# Patient Record
Sex: FEMALE | Race: ASIAN | NOT HISPANIC OR LATINO | Employment: FULL TIME | URBAN - METROPOLITAN AREA
[De-identification: names, ages, dates, MRNs, and addresses within clinical notes are randomized per-mention and may not be internally consistent; named-entity substitution may affect disease eponyms.]

---

## 2017-09-12 ENCOUNTER — OFFICE VISIT (OUTPATIENT)
Dept: FAMILY MEDICINE | Facility: CLINIC | Age: 27
End: 2017-09-12
Attending: FAMILY MEDICINE
Payer: COMMERCIAL

## 2017-09-12 ENCOUNTER — LAB VISIT (OUTPATIENT)
Dept: LAB | Facility: HOSPITAL | Age: 27
End: 2017-09-12
Attending: FAMILY MEDICINE
Payer: COMMERCIAL

## 2017-09-12 VITALS
SYSTOLIC BLOOD PRESSURE: 90 MMHG | RESPIRATION RATE: 16 BRPM | OXYGEN SATURATION: 97 % | HEIGHT: 63 IN | DIASTOLIC BLOOD PRESSURE: 68 MMHG | BODY MASS INDEX: 18.21 KG/M2 | WEIGHT: 102.81 LBS | HEART RATE: 70 BPM

## 2017-09-12 DIAGNOSIS — Z01.419 ENCOUNTER FOR GYNECOLOGICAL EXAMINATION WITH PAPANICOLAOU SMEAR OF CERVIX: Primary | ICD-10-CM

## 2017-09-12 DIAGNOSIS — Z01.419 ENCOUNTER FOR GYNECOLOGICAL EXAMINATION WITH PAPANICOLAOU SMEAR OF CERVIX: ICD-10-CM

## 2017-09-12 LAB
ALBUMIN SERPL BCP-MCNC: 4 G/DL
ALP SERPL-CCNC: 48 U/L
ALT SERPL W/O P-5'-P-CCNC: 17 U/L
ANION GAP SERPL CALC-SCNC: 6 MMOL/L
AST SERPL-CCNC: 21 U/L
BASOPHILS # BLD AUTO: 0.01 K/UL
BASOPHILS NFR BLD: 0.2 %
BILIRUB SERPL-MCNC: 0.9 MG/DL
BILIRUB SERPL-MCNC: NEGATIVE MG/DL
BLOOD URINE, POC: NEGATIVE
BUN SERPL-MCNC: 15 MG/DL
CALCIUM SERPL-MCNC: 9.9 MG/DL
CHLORIDE SERPL-SCNC: 107 MMOL/L
CO2 SERPL-SCNC: 25 MMOL/L
COLOR, POC UA: YELLOW
CREAT SERPL-MCNC: 0.7 MG/DL
DIFFERENTIAL METHOD: ABNORMAL
EOSINOPHIL # BLD AUTO: 0.1 K/UL
EOSINOPHIL NFR BLD: 1 %
ERYTHROCYTE [DISTWIDTH] IN BLOOD BY AUTOMATED COUNT: 12.1 %
EST. GFR  (AFRICAN AMERICAN): >60 ML/MIN/1.73 M^2
EST. GFR  (NON AFRICAN AMERICAN): >60 ML/MIN/1.73 M^2
GLUCOSE SERPL-MCNC: 76 MG/DL
GLUCOSE UR QL STRIP: NORMAL
HCT VFR BLD AUTO: 37.9 %
HGB BLD-MCNC: 13.4 G/DL
KETONES UR QL STRIP: NEGATIVE
LEUKOCYTE ESTERASE URINE, POC: NEGATIVE
LYMPHOCYTES # BLD AUTO: 1.8 K/UL
LYMPHOCYTES NFR BLD: 34.1 %
MCH RBC QN AUTO: 30.5 PG
MCHC RBC AUTO-ENTMCNC: 35.4 G/DL
MCV RBC AUTO: 86 FL
MONOCYTES # BLD AUTO: 0.5 K/UL
MONOCYTES NFR BLD: 9.1 %
NEUTROPHILS # BLD AUTO: 2.9 K/UL
NEUTROPHILS NFR BLD: 55.6 %
NITRITE, POC UA: NEGATIVE
PH, POC UA: 7
PLATELET # BLD AUTO: 236 K/UL
PMV BLD AUTO: 8.7 FL
POTASSIUM SERPL-SCNC: 4.1 MMOL/L
PROT SERPL-MCNC: 7 G/DL
PROTEIN, POC: NEGATIVE
RBC # BLD AUTO: 4.4 M/UL
SODIUM SERPL-SCNC: 138 MMOL/L
SPECIFIC GRAVITY, POC UA: 1
TSH SERPL DL<=0.005 MIU/L-ACNC: 0.87 UIU/ML
UROBILINOGEN, POC UA: NORMAL
WBC # BLD AUTO: 5.19 K/UL

## 2017-09-12 PROCEDURE — 86703 HIV-1/HIV-2 1 RESULT ANTBDY: CPT

## 2017-09-12 PROCEDURE — 88141 CYTOPATH C/V INTERPRET: CPT | Mod: ,,, | Performed by: PATHOLOGY

## 2017-09-12 PROCEDURE — 80053 COMPREHEN METABOLIC PANEL: CPT

## 2017-09-12 PROCEDURE — 84443 ASSAY THYROID STIM HORMONE: CPT

## 2017-09-12 PROCEDURE — 99385 PREV VISIT NEW AGE 18-39: CPT | Mod: 25,S$GLB,, | Performed by: FAMILY MEDICINE

## 2017-09-12 PROCEDURE — 86695 HERPES SIMPLEX TYPE 1 TEST: CPT

## 2017-09-12 PROCEDURE — 81001 URINALYSIS AUTO W/SCOPE: CPT | Mod: S$GLB,,, | Performed by: FAMILY MEDICINE

## 2017-09-12 PROCEDURE — 87591 N.GONORRHOEAE DNA AMP PROB: CPT

## 2017-09-12 PROCEDURE — 88175 CYTOPATH C/V AUTO FLUID REDO: CPT | Performed by: PATHOLOGY

## 2017-09-12 PROCEDURE — 87624 HPV HI-RISK TYP POOLED RSLT: CPT

## 2017-09-12 PROCEDURE — 36415 COLL VENOUS BLD VENIPUNCTURE: CPT | Mod: PO

## 2017-09-12 PROCEDURE — 99999 PR PBB SHADOW E&M-NEW PATIENT-LVL III: CPT | Mod: PBBFAC,,, | Performed by: FAMILY MEDICINE

## 2017-09-12 PROCEDURE — 85025 COMPLETE CBC W/AUTO DIFF WBC: CPT

## 2017-09-12 RX ORDER — NORGESTIMATE AND ETHINYL ESTRADIOL 7DAYSX3 28
1 KIT ORAL DAILY
Qty: 84 TABLET | Refills: 3 | Status: SHIPPED | OUTPATIENT
Start: 2017-09-12 | End: 2017-10-18

## 2017-09-12 NOTE — PATIENT INSTRUCTIONS
Your test results will be communicated to you via : [facility], Telephone or Letter.   If you have not received your test results in one week, please contact the clinic at [phone].

## 2017-09-12 NOTE — PROGRESS NOTES
Subjective:       Patient ID: Ramona Dukes is a 27 y.o. female.    Chief Complaint: Gynecologic Exam    HPI   Pt is here for wwe she is well no abnl vaginal bleeding no discharge itching or burnign no dysuria or hematuria no brbpr   Review of Systems   Constitutional: Negative for activity change, chills, diaphoresis, fatigue and fever.   HENT: Negative for congestion, ear discharge, ear pain, hearing loss, postnasal drip, rhinorrhea, sinus pressure, sneezing, sore throat, trouble swallowing and voice change.    Eyes: Negative for photophobia, discharge, redness, itching and visual disturbance.   Respiratory: Negative for cough, chest tightness, shortness of breath and wheezing.    Cardiovascular: Negative for chest pain, palpitations and leg swelling.   Gastrointestinal: Negative for abdominal pain, anal bleeding, blood in stool, constipation, diarrhea, nausea, rectal pain and vomiting.   Genitourinary: Negative for dyspareunia, dysuria, flank pain, frequency, hematuria, menstrual problem, pelvic pain, urgency, vaginal bleeding and vaginal discharge.   Musculoskeletal: Negative for arthralgias, back pain, joint swelling and neck pain.   Skin: Negative for color change and rash.   Neurological: Negative for dizziness, speech difficulty, weakness, light-headedness, numbness and headaches.   Hematological: Does not bruise/bleed easily.   Psychiatric/Behavioral: Negative for agitation, confusion, decreased concentration, sleep disturbance and suicidal ideas. The patient is not nervous/anxious.       gObjective:      Physical Exam   Constitutional: She appears well-developed and well-nourished.   HENT:   Head: Normocephalic and atraumatic.   Right Ear: External ear normal.   Left Ear: External ear normal.   Nose: Nose normal.   Mouth/Throat: Oropharynx is clear and moist.   Eyes: Conjunctivae and EOM are normal. Pupils are equal, round, and reactive to light. Right eye exhibits no discharge. Left eye exhibits no  "discharge.   Neck: Normal range of motion. Neck supple. No thyromegaly present.   Cardiovascular: Normal rate, regular rhythm, normal heart sounds and intact distal pulses.  Exam reveals no gallop and no friction rub.    No murmur heard.  Pulmonary/Chest: Effort normal and breath sounds normal. She has no wheezes. She has no rales.   Abdominal: Soft. Bowel sounds are normal. She exhibits no distension. There is no tenderness. There is no rebound and no guarding.   Genitourinary: Vagina normal and uterus normal. No vaginal discharge found.   Genitourinary Comments: nefg v/v urethra/urethral meatus w/o leisons or prolapse normal hair distribution cervix pink no adnexal tenderness or fullness moveable uterus    Breasts symmetric no masses nipple discharge or overlying skin changes    Musculoskeletal: Normal range of motion. She exhibits no edema or tenderness.   Lymphadenopathy:     She has no cervical adenopathy.   Neurological: She is alert. No cranial nerve deficit. She exhibits normal muscle tone. Coordination normal.   Skin: Skin is warm and dry. No rash noted. No erythema.   Psychiatric: She has a normal mood and affect. Her behavior is normal. Judgment and thought content normal.       Assessment:       1. Encounter for gynecological examination with Papanicolaou smear of cervix        Plan:     orders cmp liid cbc tsh urine pap gc/chl hiv hsv   Cont meds   Low fat diet  Graded exercise    Health maintenance  Pap today  Breast exam today  Lipid declined pt not fasting  Flu shot in fall  Tetanus q 10 years  "This note will not be shared with the patient."   "

## 2017-09-13 LAB
C TRACH DNA SPEC QL NAA+PROBE: NOT DETECTED
HIV 1+2 AB+HIV1 P24 AG SERPL QL IA: NEGATIVE
N GONORRHOEA DNA SPEC QL NAA+PROBE: NOT DETECTED

## 2017-09-15 LAB
HSV1 IGG SERPL QL IA: POSITIVE
HSV2 IGG SERPL QL IA: NEGATIVE

## 2017-09-19 LAB
HPV HR 12 DNA CVX QL NAA+PROBE: POSITIVE
HPV16 DNA SPEC QL NAA+PROBE: NEGATIVE
HPV18 DNA SPEC QL NAA+PROBE: NEGATIVE

## 2017-10-09 ENCOUNTER — TELEPHONE (OUTPATIENT)
Dept: FAMILY MEDICINE | Facility: CLINIC | Age: 27
End: 2017-10-09

## 2017-10-09 NOTE — TELEPHONE ENCOUNTER
----- Message from Lisa Seymourleila sent at 10/9/2017  3:37 PM CDT -----  Contact: Patient  x_  1st Request  _  2nd Request  _  3rd Request        Who: MOOK FINNEY [[id number]]    Why: Pt called to schedule a follow up appointment per the doctor's request. I attempted to schedule but nothing until November. Please call her.     What Number to Call Back: [phone]    When to Expect a call back: (Before the end of the day)   -- if the call is after 12:00, the call back will be tomorrow.

## 2017-10-18 ENCOUNTER — OFFICE VISIT (OUTPATIENT)
Dept: FAMILY MEDICINE | Facility: CLINIC | Age: 27
End: 2017-10-18
Attending: FAMILY MEDICINE
Payer: COMMERCIAL

## 2017-10-18 VITALS
WEIGHT: 104.81 LBS | SYSTOLIC BLOOD PRESSURE: 122 MMHG | OXYGEN SATURATION: 99 % | HEIGHT: 63 IN | BODY MASS INDEX: 18.57 KG/M2 | HEART RATE: 75 BPM | RESPIRATION RATE: 16 BRPM | DIASTOLIC BLOOD PRESSURE: 60 MMHG

## 2017-10-18 DIAGNOSIS — R87.618 PAP SMEAR ABNORMALITY OF CERVIX/HUMAN PAPILLOMAVIRUS (HPV) POSITIVE: Primary | ICD-10-CM

## 2017-10-18 PROCEDURE — 90471 IMMUNIZATION ADMIN: CPT | Mod: S$GLB,,, | Performed by: FAMILY MEDICINE

## 2017-10-18 PROCEDURE — 90686 IIV4 VACC NO PRSV 0.5 ML IM: CPT | Mod: S$GLB,,, | Performed by: FAMILY MEDICINE

## 2017-10-18 PROCEDURE — 99999 PR PBB SHADOW E&M-EST. PATIENT-LVL III: CPT | Mod: PBBFAC,,, | Performed by: FAMILY MEDICINE

## 2017-10-18 PROCEDURE — 90472 IMMUNIZATION ADMIN EACH ADD: CPT | Mod: S$GLB,,, | Performed by: FAMILY MEDICINE

## 2017-10-18 PROCEDURE — 90715 TDAP VACCINE 7 YRS/> IM: CPT | Mod: S$GLB,,, | Performed by: FAMILY MEDICINE

## 2017-10-18 PROCEDURE — 99213 OFFICE O/P EST LOW 20 MIN: CPT | Mod: 25,S$GLB,, | Performed by: FAMILY MEDICINE

## 2017-10-18 NOTE — PROGRESS NOTES
"Subjective:       Patient ID: Rmaona Dukes is a 27 y.o. female.    Chief Complaint: Results    HPI   Pt is here for follow up of pos hpv she has had normal pap in the past she is willing to see gyn for their work up   Pt has been stressed about this headaches constipation al little sad denies panic attacks denies si/hi feels better with our discussion    Review of Systems   Constitutional: Negative for activity change, fatigue and unexpected weight change.   HENT: Negative for hearing loss, rhinorrhea and trouble swallowing.    Eyes: Negative for discharge and visual disturbance.   Respiratory: Negative for chest tightness and wheezing.    Cardiovascular: Negative for chest pain and palpitations.   Gastrointestinal: Positive for constipation. Negative for blood in stool, diarrhea and vomiting.   Endocrine: Negative for polydipsia and polyuria.   Genitourinary: Negative for difficulty urinating, dysuria, hematuria and menstrual problem.   Musculoskeletal: Negative for arthralgias, joint swelling and neck pain.   Neurological: Positive for headaches. Negative for weakness.   Psychiatric/Behavioral: Positive for dysphoric mood. Negative for confusion.       Objective:      Physical Exam   Constitutional: She appears well-developed and well-nourished. No distress.   Cardiovascular: Normal rate and regular rhythm.  Exam reveals no gallop.    Pulmonary/Chest: Effort normal and breath sounds normal. No respiratory distress. She has no rales.   Abdominal: Soft. Bowel sounds are normal. She exhibits no distension and no mass. There is no tenderness.       Assessment:       1. Pap smear abnormality of cervix/human papillomavirus (HPV) positive        Plan:     f/u gyn  Use condoms  rtc prn       "This note will not be shared with the patient."   "

## 2017-10-18 NOTE — PROGRESS NOTES
Two patient identifiers verified. Allergies reviewed.  Tdap IM administered to Left deltoid and FLU Vaccine IM administered to Right deltoid per MD order. Patient tolerated injection well: no redness, bleeding, or bruising noted to injection site. Patient instructed to remain in clinic setting for 15 minutes.

## 2017-10-18 NOTE — PATIENT INSTRUCTIONS
Your test results will be communicated to you via : [facility], Telephone or Letter.   If you have not received your test results in one week, please contact the clinic at [phone].

## 2017-10-25 ENCOUNTER — OFFICE VISIT (OUTPATIENT)
Dept: OBSTETRICS AND GYNECOLOGY | Facility: CLINIC | Age: 27
End: 2017-10-25
Attending: FAMILY MEDICINE
Payer: COMMERCIAL

## 2017-10-25 VITALS
WEIGHT: 107.56 LBS | DIASTOLIC BLOOD PRESSURE: 60 MMHG | HEIGHT: 63 IN | BODY MASS INDEX: 19.06 KG/M2 | SYSTOLIC BLOOD PRESSURE: 100 MMHG

## 2017-10-25 DIAGNOSIS — Z30.09 GENERAL COUNSELING AND ADVICE FOR CONTRACEPTIVE MANAGEMENT: ICD-10-CM

## 2017-10-25 DIAGNOSIS — N92.6 IRREGULAR MENSES: ICD-10-CM

## 2017-10-25 DIAGNOSIS — B97.7 HIGH RISK HPV INFECTION: Primary | ICD-10-CM

## 2017-10-25 DIAGNOSIS — N94.6 DYSMENORRHEA: ICD-10-CM

## 2017-10-25 DIAGNOSIS — Z71.85 HPV VACCINE COUNSELING: ICD-10-CM

## 2017-10-25 PROCEDURE — 99203 OFFICE O/P NEW LOW 30 MIN: CPT | Mod: S$GLB,,, | Performed by: OBSTETRICS & GYNECOLOGY

## 2017-10-25 PROCEDURE — 99999 PR PBB SHADOW E&M-EST. PATIENT-LVL III: CPT | Mod: PBBFAC,,, | Performed by: OBSTETRICS & GYNECOLOGY

## 2017-10-25 NOTE — PROGRESS NOTES
HISTORY OF PRESENT ILLNESS:    Ramona Dukes is a 27 y.o. female, , Patient's last menstrual period was 10/04/2017.,  presents from PCP for  ASCUS/HPV+ pap.  No prior abnormal pap - last screening about 3 years ago.  Has not had gardasil.    On OCPs in the past but had trouble remembering and was hernandez on it.  Long history of dysmenorrhea and irregular cycles.  Wants to resume contraception.    Past Medical History:   Diagnosis Date    Abnormal Pap smear of cervix        Past Surgical History:   Procedure Laterality Date    APPENDECTOMY         MEDICATIONS AND ALLERGIES:    No current outpatient prescriptions on file.    Review of patient's allergies indicates:  No Known Allergies    Family History   Problem Relation Age of Onset    Breast cancer Neg Hx     Colon cancer Neg Hx     Ovarian cancer Neg Hx        Social History     Social History    Marital status: Single     Spouse name: N/A    Number of children: N/A    Years of education: N/A     Occupational History    Not on file.     Social History Main Topics    Smoking status: Never Smoker    Smokeless tobacco: Never Used    Alcohol use Yes    Drug use: No    Sexual activity: Yes     Partners: Male     Birth control/ protection: None     Other Topics Concern    Not on file     Social History Narrative    No narrative on file       COMPREHENSIVE GYN HISTORY:  Infection History: Denies STDs. Denies PID.  Benign History: Denies uterine fibroids. Denies ovarian cysts. Denies endometriosis. Denies other conditions.  Cancer History: Denies cervical cancer. Denies uterine cancer or hyperplasia. Denies ovarian cancer. Denies vulvar cancer or pre-cancer. Denies vaginal cancer or pre-cancer. Denies breast cancer. Denies colon cancer.    ROS:  GENERAL: No weight changes. No swelling. No fatigue. No fever.  CARDIOVASCULAR: No chest pain. No shortness of breath. No leg cramps.   NEUROLOGICAL: No headaches. No vision changes.  BREASTS: No pain. No  "lumps. No discharge.  ABDOMEN: No pain. No nausea. No vomiting. No diarrhea. No constipation.  REPRODUCTIVE: No abnormal bleeding.   VULVA: No pain. No lesions. No itching.  VAGINA: No relaxation. No itching. No odor. No discharge. No lesions.  URINARY: No incontinence. No nocturia. No frequency. No dysuria.    /60   Ht 5' 3" (1.6 m)   Wt 48.8 kg (107 lb 9.4 oz)   LMP 10/04/2017   BMI 19.06 kg/m²     PE:  APPEARANCE: Well nourished, well developed, in no acute distress.  ABDOMEN: Soft. No tenderness or masses. No hepatosplenomegaly. No hernias.  BREASTS, FUNDOSCOPIC, RECTAL DEFERRED  PELVIC: External female genitalia without lesions.  Female hair distribution. Adequate perineal body, Normal urethral meatus. Vagina moist and well rugated without lesions or discharge.  No significant cystocele or rectocele present. Cervix pink without lesions, discharge or tenderness. Uterus is normal size, regular, mobile and nontender. Adnexa without masses or tenderness.  EXTREMITIES: No edema      DIAGNOSIS:  1. High risk HPV infection     2. HPV vaccine counseling  (In Office Administered) HPV Vaccine (9-Valent) (3 Dose) (IM)   3. Dysmenorrhea  US Pelvis Complete Non OB   4. General counseling and advice for contraceptive management     5. Irregular menses       COUNSELING:  Patient counseled regarding pap screening, HPV, recommendation for colpo, and gardasil.  Patient will start gardasil  When she comes back for colpo.  Patient counseled regarding contraceptive options.  Desires iud - mirena or asha.  Will check coverage  "

## 2017-10-30 ENCOUNTER — TELEPHONE (OUTPATIENT)
Dept: OBSTETRICS AND GYNECOLOGY | Facility: CLINIC | Age: 27
End: 2017-10-30

## 2017-10-30 NOTE — TELEPHONE ENCOUNTER
----- Message from Daria Jennings sent at 10/30/2017 10:11 AM CDT -----  Contact: MOOK FINNEY [[id number]]  x_  1st Request  _  2nd Request  _  3rd Request        Who: MOOK FINNEY [[id number]]    Why: patient states she would like to reschedule her procedure to tomorrow before 1PM if possible. Please advise    What Number to Call Back: [phone]    When to Expect a call back: (Before the end of the day)   -- if call after 3:00 call back will be tomorrow.

## 2017-11-08 ENCOUNTER — TELEPHONE (OUTPATIENT)
Dept: OBSTETRICS AND GYNECOLOGY | Facility: CLINIC | Age: 27
End: 2017-11-08

## 2017-11-08 NOTE — TELEPHONE ENCOUNTER
----- Message from Soo Etienne sent at 11/6/2017 10:22 AM CST -----  Contact: self  _ x 1st Request  _  2nd Request  _  3rd Request    Who: pt    Why: pt would like to speak with a nurse in regards to scheduling a procedure.. Please advise...    What Number to Call Back: [phone]    When to Expect a call back: (Before the end of the day)   -- if call after 3:00 call back will be tomorrow.

## 2017-11-10 ENCOUNTER — TELEPHONE (OUTPATIENT)
Dept: OBSTETRICS AND GYNECOLOGY | Facility: CLINIC | Age: 27
End: 2017-11-10

## 2017-11-10 NOTE — TELEPHONE ENCOUNTER
----- Message from Bryan Alberto sent at 11/10/2017  3:14 PM CST -----  Contact: Pt  X_ 1st Request  _ 2nd Request  _ 3rd Request    Who: MOOK FINNEY [[id number]]    Why: Patient returning a call in regards to a scheduling procedure    What Number to Call Back: [phone]    When to Expect a call back: (Before the end of the day)  -- if call after 3:00 call back will be tomorrow.

## 2017-11-15 ENCOUNTER — TELEPHONE (OUTPATIENT)
Dept: FAMILY MEDICINE | Facility: CLINIC | Age: 27
End: 2017-11-15

## 2017-11-15 ENCOUNTER — CLINICAL SUPPORT (OUTPATIENT)
Dept: OBSTETRICS AND GYNECOLOGY | Facility: CLINIC | Age: 27
End: 2017-11-15
Payer: COMMERCIAL

## 2017-11-15 ENCOUNTER — HOSPITAL ENCOUNTER (OUTPATIENT)
Dept: RADIOLOGY | Facility: OTHER | Age: 27
Discharge: HOME | End: 2017-11-15
Attending: OBSTETRICS & GYNECOLOGY
Payer: COMMERCIAL

## 2017-11-15 ENCOUNTER — PROCEDURE VISIT (OUTPATIENT)
Dept: OBSTETRICS AND GYNECOLOGY | Facility: CLINIC | Age: 27
End: 2017-11-15
Attending: OBSTETRICS & GYNECOLOGY
Payer: COMMERCIAL

## 2017-11-15 VITALS
DIASTOLIC BLOOD PRESSURE: 70 MMHG | SYSTOLIC BLOOD PRESSURE: 98 MMHG | BODY MASS INDEX: 18.64 KG/M2 | HEIGHT: 63 IN | WEIGHT: 105.19 LBS

## 2017-11-15 DIAGNOSIS — N94.6 DYSMENORRHEA: ICD-10-CM

## 2017-11-15 DIAGNOSIS — Z71.85 HPV VACCINE COUNSELING: ICD-10-CM

## 2017-11-15 DIAGNOSIS — Z76.89 ENCOUNTER FOR BIOPSY: ICD-10-CM

## 2017-11-15 DIAGNOSIS — B97.7 HIGH RISK HPV INFECTION: Primary | ICD-10-CM

## 2017-11-15 PROCEDURE — 57454 BX/CURETT OF CERVIX W/SCOPE: CPT | Mod: S$GLB,,, | Performed by: OBSTETRICS & GYNECOLOGY

## 2017-11-15 PROCEDURE — 88305 TISSUE EXAM BY PATHOLOGIST: CPT | Performed by: PATHOLOGY

## 2017-11-15 PROCEDURE — 90651 9VHPV VACCINE 2/3 DOSE IM: CPT | Mod: S$GLB,,, | Performed by: OBSTETRICS & GYNECOLOGY

## 2017-11-15 PROCEDURE — 88342 IMHCHEM/IMCYTCHM 1ST ANTB: CPT | Mod: 26,,, | Performed by: PATHOLOGY

## 2017-11-15 PROCEDURE — 76856 US EXAM PELVIC COMPLETE: CPT | Mod: TC

## 2017-11-15 PROCEDURE — 88305 TISSUE EXAM BY PATHOLOGIST: CPT | Mod: 26,,, | Performed by: PATHOLOGY

## 2017-11-15 PROCEDURE — 76856 US EXAM PELVIC COMPLETE: CPT | Mod: 26,,, | Performed by: RADIOLOGY

## 2017-11-15 PROCEDURE — 90471 IMMUNIZATION ADMIN: CPT | Mod: S$GLB,,, | Performed by: OBSTETRICS & GYNECOLOGY

## 2017-11-15 NOTE — PROGRESS NOTES
Here for Gardasil # 1  injection, without complaint at this time. Reports no pain before or after injection. Advised to wait in lobby 15 minutes after injection and report any adverse reactions. Return to clinic in   1-2 months  for next injection.         Site: LD

## 2017-11-15 NOTE — TELEPHONE ENCOUNTER
----- Message from Sabrina Chung MD sent at 11/15/2017 11:21 AM CST -----  Good morning,    Ms Greene came to see me today for a gyn procedure.  She is complaining of dizziness for the last 7 days.  She tried to schedule an appointment with Dr. Matthews but was told there was no availability until January.  Can you please help her see either Dr. Matthews or one of her partners this week?    Thanks,  Sabrina Chung MD

## 2017-11-15 NOTE — PROCEDURES
Procedures   COLPOSCOPY:    Ramona Dukes is a 27 y.o. female   presents for colposcopy.  Patient's last menstrual period was 10/30/2017..  Her most recent pap smear shows ASCUS with POSITIVE high risk HPV.      The abnormal test findings were discussed, as well as HPV infection, need for colposcopy and possible biopsies to determine the plan of care, treatments available, the minimal risk of bleeding and infection with colposcopy, and alternatives to colposcopy and she agrees to proceed.      UPT is negative    COLPOSCOPY EXAM:   TIME OUT PERFORMED.     acetowhite lesion(s) noted at 12 o'clock    Biopsy was taken at 12 o'clock.  ECC was performed    Hemostasis was adequate with application of Monsel's solution.  The speculum was removed.  The patient did tolerate the procedure well.    All collected specimens sent to pathology for histologic analysis.    Post-colposcopy counseling:  The patient was instructed to manage post-colposcopy cramping with NSAIDs or Tylenol, or with a prescription per the medication card.  Avoid intercourse, douching, or tampons in the vagina for at least 2-3 days.  Expect a clumpy blackish discharge due to Monsel's solution application for several days.  Report heavy bleeding, worsening pain or pain that does not respond to above medications, or foul-smelling vaginal discharge. HPV vaccine recommended according to FDA age guidelines.  Importance of follow-up stressed.      Follow up based on colposcopy results.

## 2017-11-18 ENCOUNTER — HOSPITAL ENCOUNTER (EMERGENCY)
Facility: OTHER | Age: 27
Discharge: HOME | End: 2017-11-18
Attending: EMERGENCY MEDICINE
Payer: COMMERCIAL

## 2017-11-18 VITALS
HEART RATE: 71 BPM | DIASTOLIC BLOOD PRESSURE: 64 MMHG | RESPIRATION RATE: 18 BRPM | SYSTOLIC BLOOD PRESSURE: 106 MMHG | TEMPERATURE: 98 F | HEIGHT: 63 IN | WEIGHT: 106 LBS | BODY MASS INDEX: 18.78 KG/M2 | OXYGEN SATURATION: 100 %

## 2017-11-18 DIAGNOSIS — R42 LIGHTHEADEDNESS: Primary | ICD-10-CM

## 2017-11-18 LAB
ALBUMIN SERPL BCP-MCNC: 4.1 G/DL
ALP SERPL-CCNC: 51 U/L
ALT SERPL W/O P-5'-P-CCNC: 17 U/L
AMPHET+METHAMPHET UR QL: NEGATIVE
ANION GAP SERPL CALC-SCNC: 7 MMOL/L
AST SERPL-CCNC: 18 U/L
B-HCG UR QL: NEGATIVE
BARBITURATES UR QL SCN>200 NG/ML: NEGATIVE
BASOPHILS # BLD AUTO: 0.02 K/UL
BASOPHILS NFR BLD: 0.3 %
BENZODIAZ UR QL SCN>200 NG/ML: NEGATIVE
BILIRUB SERPL-MCNC: 0.4 MG/DL
BUN SERPL-MCNC: 13 MG/DL
BZE UR QL SCN: NEGATIVE
CALCIUM SERPL-MCNC: 9.9 MG/DL
CANNABINOIDS UR QL SCN: NEGATIVE
CHLORIDE SERPL-SCNC: 105 MMOL/L
CO2 SERPL-SCNC: 27 MMOL/L
CREAT SERPL-MCNC: 0.7 MG/DL
CREAT UR-MCNC: 17.1 MG/DL
CTP QC/QA: YES
DIFFERENTIAL METHOD: ABNORMAL
EOSINOPHIL # BLD AUTO: 0.1 K/UL
EOSINOPHIL NFR BLD: 1.8 %
ERYTHROCYTE [DISTWIDTH] IN BLOOD BY AUTOMATED COUNT: 12 %
EST. GFR  (AFRICAN AMERICAN): >60 ML/MIN/1.73 M^2
EST. GFR  (NON AFRICAN AMERICAN): >60 ML/MIN/1.73 M^2
GLUCOSE SERPL-MCNC: 87 MG/DL
HCT VFR BLD AUTO: 39.2 %
HGB BLD-MCNC: 13.3 G/DL
LYMPHOCYTES # BLD AUTO: 2.2 K/UL
LYMPHOCYTES NFR BLD: 30.6 %
MAGNESIUM SERPL-MCNC: 1.9 MG/DL
MCH RBC QN AUTO: 30.7 PG
MCHC RBC AUTO-ENTMCNC: 33.9 G/DL
MCV RBC AUTO: 91 FL
METHADONE UR QL SCN>300 NG/ML: NEGATIVE
MONOCYTES # BLD AUTO: 0.5 K/UL
MONOCYTES NFR BLD: 7.2 %
NEUTROPHILS # BLD AUTO: 4.3 K/UL
NEUTROPHILS NFR BLD: 60.1 %
OPIATES UR QL SCN: NEGATIVE
PCP UR QL SCN>25 NG/ML: NEGATIVE
PHOSPHATE SERPL-MCNC: 3 MG/DL
PLATELET # BLD AUTO: 204 K/UL
PMV BLD AUTO: 8.5 FL
POCT GLUCOSE: 91 MG/DL (ref 70–110)
POTASSIUM SERPL-SCNC: 3.8 MMOL/L
PROT SERPL-MCNC: 6.9 G/DL
RBC # BLD AUTO: 4.33 M/UL
SODIUM SERPL-SCNC: 139 MMOL/L
TOXICOLOGY INFORMATION: NORMAL
WBC # BLD AUTO: 7.18 K/UL

## 2017-11-18 PROCEDURE — 85025 COMPLETE CBC W/AUTO DIFF WBC: CPT

## 2017-11-18 PROCEDURE — 82962 GLUCOSE BLOOD TEST: CPT

## 2017-11-18 PROCEDURE — 93010 ELECTROCARDIOGRAM REPORT: CPT | Mod: ,,, | Performed by: INTERNAL MEDICINE

## 2017-11-18 PROCEDURE — 83735 ASSAY OF MAGNESIUM: CPT

## 2017-11-18 PROCEDURE — 93005 ELECTROCARDIOGRAM TRACING: CPT

## 2017-11-18 PROCEDURE — 25000003 PHARM REV CODE 250: Performed by: PHYSICIAN ASSISTANT

## 2017-11-18 PROCEDURE — 80307 DRUG TEST PRSMV CHEM ANLYZR: CPT

## 2017-11-18 PROCEDURE — 80053 COMPREHEN METABOLIC PANEL: CPT

## 2017-11-18 PROCEDURE — 84100 ASSAY OF PHOSPHORUS: CPT

## 2017-11-18 PROCEDURE — 81025 URINE PREGNANCY TEST: CPT | Performed by: EMERGENCY MEDICINE

## 2017-11-18 PROCEDURE — 99284 EMERGENCY DEPT VISIT MOD MDM: CPT | Mod: 25

## 2017-11-18 PROCEDURE — 96360 HYDRATION IV INFUSION INIT: CPT

## 2017-11-18 RX ORDER — SODIUM CHLORIDE 9 MG/ML
1000 INJECTION, SOLUTION INTRAVENOUS
Status: COMPLETED | OUTPATIENT
Start: 2017-11-18 | End: 2017-11-18

## 2017-11-18 RX ADMIN — SODIUM CHLORIDE 1000 ML: 0.9 INJECTION, SOLUTION INTRAVENOUS at 03:11

## 2017-11-18 NOTE — ED NOTES
Patient Identifiers for Ramona Dukes checked and correct.  Pt and pt's sister report feeling faint, did report having 2 alcoholic drinks last night  LOC: The patient is awake, alert and aware of environment with an appropriate affect, the patient is oriented x 3 and speaking appropriate.  APPEARANCE: Patient resting comfortably and in no acute distress, patient is clean and well groomed, patient's clothing is properly fastened.  SKIN: The skin is warm and dry, patient has normal skin turgor and moist mucus membranes,no rashes or lesions.Skin Intact , No Breakdown Noted  Musculoskeletal :  Normal range of motion noted. Moves all extremeties well, No swelling or tenderness noted  RESPIRATORY: Airway is open and patent, respirations are spontaneous, patient has a normal effort and rate.Bilateral Lungs Sounds are clear  CARDIAC: Patient has a normal rate and rhythm, no periphreal edema noted, capillary refill < 3 seconds.   ABDOMEN: Soft and non tender to palpation, no distention noted. Bowels Sounds are +  PULSES: 2+  And symmetrical in all extremeties  NEUROLOGIC:  facial expression is symmetrical, patient moving all extremities, normal sensation in all extremities when touched with a finger.The patient is awake, alert and cooperative with a calm affect, patient is aware of environment.    Will continue to monitor

## 2017-11-19 NOTE — ED PROVIDER NOTES
Encounter Date: 11/18/2017       History     Chief Complaint   Patient presents with    Fainting     pt states she keeps feeling like she is fainting.  Was seen at [facility] Thursday was given meds for vertigo also states she feels like her chest is tight states had blood work at  but everything was normal     Pt is a 27-year-old female with no significant medical history who presents to the emergency department with feelings of lightheadedness.  Patient states over the last 2 weeks she has not been feeling well.  She states she feels very weak with dizziness.  She states that upon rising she feels very lightheaded.  She denies tinnitus or hearing loss.  She denies fevers or chills.  She denies nausea or vomiting.  She states her symptoms are intermittent.  She denies any chest pain or shortness of breath.  She denies palpitations.  She states her periods have been regular.  She denies any rectal bleeding.  She denies decrease in appetite or fluid intake.  She denies facial asymmetry, speech slur, or extremity weakness.  She denies drug use.  She denies any change in medications.  She states she did drink alcohol last night, but not an abnormal amount.      The history is provided by the patient.     Review of patient's allergies indicates:  No Known Allergies  Past Medical History:   Diagnosis Date    Abnormal Pap smear of cervix      Past Surgical History:   Procedure Laterality Date    APPENDECTOMY       Family History   Problem Relation Age of Onset    Breast cancer Neg Hx     Colon cancer Neg Hx     Ovarian cancer Neg Hx     Cancer Neg Hx      Social History   Substance Use Topics    Smoking status: Never Smoker    Smokeless tobacco: Never Used    Alcohol use Yes     Review of Systems   Constitutional: Positive for activity change. Negative for appetite change, chills, fatigue and fever.   HENT: Negative for congestion, ear discharge, ear pain, postnasal drip, rhinorrhea, sore throat and trouble  swallowing.    Eyes: Negative for photophobia and visual disturbance.   Respiratory: Negative for cough and shortness of breath.    Cardiovascular: Negative for chest pain.   Gastrointestinal: Negative for abdominal pain, blood in stool, constipation, diarrhea, nausea and vomiting.   Genitourinary: Negative for dysuria, flank pain and hematuria.   Musculoskeletal: Negative for back pain, neck pain and neck stiffness.   Skin: Negative for rash and wound.   Neurological: Positive for dizziness, weakness (generalized) and light-headedness. Negative for tremors, speech difficulty, numbness and headaches.       Physical Exam     Initial Vitals [11/18/17 1332]   BP Pulse Resp Temp SpO2   120/78 68 15 97.5 °F (36.4 °C) 100 %      MAP       92         Physical Exam    Nursing note and vitals reviewed.  Constitutional: Vital signs are normal. She appears well-developed and well-nourished. She is not diaphoretic.  Non-toxic appearance. No distress.   HENT:   Head: Normocephalic.   Right Ear: Hearing, tympanic membrane, external ear and ear canal normal.   Left Ear: Hearing, tympanic membrane, external ear and ear canal normal.   Nose: Nose normal.   Mouth/Throat: Uvula is midline, oropharynx is clear and moist and mucous membranes are normal. No trismus in the jaw. No uvula swelling. No oropharyngeal exudate.   Eyes: Conjunctivae and EOM are normal. Pupils are equal, round, and reactive to light.   Pink conjunctiva   Neck: Normal range of motion. Neck supple. Normal range of motion present. No neck rigidity.   Cardiovascular: Normal rate, regular rhythm and normal heart sounds. Exam reveals no gallop and no friction rub.    No murmur heard.  No lower extremity edema   Abdominal: Soft. Bowel sounds are normal. She exhibits no distension and no mass. There is no tenderness. There is no rebound and no guarding.   Lymphadenopathy:     She has no cervical adenopathy.   Neurological: She is alert and oriented to person, place, and  time. She has normal strength. No cranial nerve deficit or sensory deficit. She displays a negative Romberg sign. Coordination and gait normal.   Skin: Skin is warm and dry. Capillary refill takes less than 2 seconds.   Psychiatric: She has a normal mood and affect.         ED Course   Procedures  Labs Reviewed   CBC W/ AUTO DIFFERENTIAL - Abnormal; Notable for the following:        Result Value    MPV 8.5 (*)     All other components within normal limits   COMPREHENSIVE METABOLIC PANEL - Abnormal; Notable for the following:     Alkaline Phosphatase 51 (*)     Anion Gap 7 (*)     All other components within normal limits   DRUG SCREEN PANEL, URINE EMERGENCY   MAGNESIUM   PHOSPHORUS   POCT URINE PREGNANCY   POCT GLUCOSE   POCT GLUCOSE MONITORING CONTINUOUS     EKG Readings: (Independently Interpreted)   Initial Reading: No STEMI. Rhythm: Normal Sinus Rhythm. Heart Rate: 68.     Imaging Results          CT Head Without Contrast (Final result)  Result time 11/18/17 16:55:00    Final result by Dickson Loomis MD (11/18/17 16:55:00)                 Impression:        No acute intracranial abnormalities.            Electronically signed by: DICKSON LOOMIS MD  Date:     11/18/17  Time:    16:55              Narrative:    Comparison: None    Clinical history: Dizziness    Technique:    Axial images of the brain were obtained at 5-mm intervals from the skull base to the vertex without the administration of contrast.    Findings:    The brain is normally formed and exhibits normal density throughout.  There is no evidence of acute major vascular territory infarct, hemorrhage, or mass.  There is no hydrocephalus.  There are no abnormal extra-axial fluid collections.  The paranasal sinuses and mastoid air cells are clear, and there is no evidence of calvarial fracture.  The visualized soft tissues are unremarkable.                                 Medical Decision Making:   Initial Assessment:   Urgent evaluation of a  27-year-old female with no significant medical history who presents to the emergency department with lightheadedness.  Patient is afebrile, nontoxic appearing, and hemodynamically stable.  Pink conjunctiva.  Moist mucous membranes.  Normal neuro exam.  No reported loss of consciousness with the lightheadedness.  She is not describing vertigo sensation.  Lab work will be obtained to evaluate H&H, kidney function, and electrolytes.  EKG will be obtained to rule out dangerous dysrhythmia.  UPT will be obtained.  Orthostatic vital signs will be obtained.  Head CT will be obtained to rule out acute intracranial abnormality.  ED Management:  Labwork reveals no significant anemia, kidney insufficiency, or electrolytic disturbance.  Glucose is within normal limits.  Patient is not orthostatic.  EKG reveals no dangerous dysrhythmia.  Head CT reveals no acute intracranial abnormality.  Upon reevaluation, patient states she is feeling much better.  She is advised to follow-up with cardiologist and neurologist.  She is advised to return to the emergency department with any worsening symptoms or concerns.  Other:   I have discussed this case with another health care provider.       <> Summary of the Discussion: Dr. Voss                   ED Course      Clinical Impression:   The encounter diagnosis was Lightheadedness.                           Abeba Lawson PA-C  11/18/17 1920

## 2017-11-21 ENCOUNTER — OFFICE VISIT (OUTPATIENT)
Dept: NEUROLOGY | Facility: CLINIC | Age: 27
End: 2017-11-21
Payer: COMMERCIAL

## 2017-11-21 ENCOUNTER — OFFICE VISIT (OUTPATIENT)
Dept: CARDIOLOGY | Facility: CLINIC | Age: 27
End: 2017-11-21
Payer: COMMERCIAL

## 2017-11-21 ENCOUNTER — NURSE TRIAGE (OUTPATIENT)
Dept: ADMINISTRATIVE | Facility: CLINIC | Age: 27
End: 2017-11-21

## 2017-11-21 VITALS
HEIGHT: 63 IN | BODY MASS INDEX: 18.98 KG/M2 | HEART RATE: 90 BPM | WEIGHT: 107.13 LBS | DIASTOLIC BLOOD PRESSURE: 71 MMHG | SYSTOLIC BLOOD PRESSURE: 104 MMHG

## 2017-11-21 VITALS
WEIGHT: 107.38 LBS | SYSTOLIC BLOOD PRESSURE: 110 MMHG | HEIGHT: 63 IN | HEART RATE: 77 BPM | DIASTOLIC BLOOD PRESSURE: 68 MMHG | BODY MASS INDEX: 19.03 KG/M2

## 2017-11-21 DIAGNOSIS — R51.9 NONINTRACTABLE EPISODIC HEADACHE, UNSPECIFIED HEADACHE TYPE: ICD-10-CM

## 2017-11-21 DIAGNOSIS — Z82.79 FAMILY HISTORY OF PDA (PATENT DUCTUS ARTERIOSUS): ICD-10-CM

## 2017-11-21 DIAGNOSIS — R42 EPISODIC LIGHTHEADEDNESS: Primary | ICD-10-CM

## 2017-11-21 DIAGNOSIS — R42 EPISODIC LIGHTHEADEDNESS: ICD-10-CM

## 2017-11-21 DIAGNOSIS — R55 NEAR SYNCOPE: ICD-10-CM

## 2017-11-21 PROCEDURE — 99204 OFFICE O/P NEW MOD 45 MIN: CPT | Mod: S$GLB,,, | Performed by: PSYCHIATRY & NEUROLOGY

## 2017-11-21 PROCEDURE — 99999 PR PBB SHADOW E&M-EST. PATIENT-LVL III: CPT | Mod: PBBFAC,,, | Performed by: INTERNAL MEDICINE

## 2017-11-21 PROCEDURE — 99999 PR PBB SHADOW E&M-EST. PATIENT-LVL III: CPT | Mod: PBBFAC,,, | Performed by: PSYCHIATRY & NEUROLOGY

## 2017-11-21 PROCEDURE — 99204 OFFICE O/P NEW MOD 45 MIN: CPT | Mod: S$GLB,,, | Performed by: INTERNAL MEDICINE

## 2017-11-21 NOTE — PATIENT INSTRUCTIONS
Discussed with patient and her sister who was present today.  Her episodes of lightheadedness mostly related to postural changes may suggest orthostatic hypotension however the possibility of cardiac rhythm disturbance needs to be ruled out.  Advised to keep her cardiology appointment.  Differential if workup is negative would include the possibility of a vertiginous migraine.  She does have history of headaches that have the description of mild migraines without aura.  CT scan of the head results were reviewed with the patient.  This was normal.  Advised patient to initiate the magnesium oxide 250 mg OTC supplementation daily at bedtime for headache prophylaxis.  She will contact me once cardiology workup is completed and she is seen by her primary care physician for a medical workup.

## 2017-11-21 NOTE — PROGRESS NOTES
Subjective:       Patient ID: Ramona Dukes is a 27 y.o. female.    Chief Complaint:  Dizziness      History of Present Illness  HPI   This is a 27-year-old female who was referred from the emergency room for episodic lightheadedness.  She has had these symptoms intermittently for the past 1 month with fluctuating intensity.  Symptoms were initially mild however more recently she reports that theyve been present almost on a daily basis and that she feels as if she is going to pass out and has to sit down or rest to feel better.  She also notes that on a couple of occasions she has had a pressure-like sensation in her chest with radiation to the neck.  She denies any palpitations.  She has had left-sided neck pain off and on in the past.  The symptoms are not associated with true vertigo, diplopia or other visual symptoms or headaches.      The patient does have a history of chronic headaches that are usually frontotemporal occurring about once or twice a month, pressure-like lasting for about an hour or 2.  They usually respond to OTC medications.  The patient had seen an eye doctor when she was getting these headaches as she does wear eyeglasses since childhood however the eye examination was normal.  When seen at the emergency room she had a CT scan of the brain done that was normal.  She was then advised to see neurology and cardiology.  Lab work done was also reviewed and was essentially normal including a toxicology screen.  The patient does admit to some stress at work as she works at the  at [employer], occasionally working long hours.  She does report that when she feels lightheaded she has to sit down to feel better.       Review of Systems  Review of Systems   Constitutional: Negative.    HENT: Negative.  Negative for hearing loss.    Eyes: Negative.  Negative for visual disturbance.   Respiratory: Negative.  Negative for shortness of breath.    Cardiovascular: Negative.  Negative for chest  pain and palpitations.   Gastrointestinal: Negative.    Genitourinary: Negative.    Musculoskeletal: Negative.  Negative for back pain, gait problem and neck pain.   Skin: Negative.    Neurological: Positive for light-headedness and headaches. Negative for tremors, seizures, syncope, speech difficulty, weakness and numbness.   Psychiatric/Behavioral: Negative.  Negative for confusion and decreased concentration.       Objective:      Neurologic Exam     Mental Status   Oriented to person, place, and time.   Registration: recalls 3 of 3 objects. Follows 3 step commands.   Attention: normal. Concentration: normal.   Speech: speech is normal   Level of consciousness: alert  Knowledge: good.   Able to name object. Able to read. Able to repeat. Able to write. Normal comprehension.     Cranial Nerves   Cranial nerves II through XII intact.     Motor Exam   Muscle bulk: normal  Overall muscle tone: normal    Strength   Strength 5/5 throughout.     Sensory Exam   Light touch normal.   Proprioception normal.   Pinprick normal.     Gait, Coordination, and Reflexes     Gait  Gait: normal    Coordination   Romberg: negative  Finger to nose coordination: normal    Tremor   Resting tremor: absent  Intention tremor: absent  Action tremor: absent    Reflexes   Right brachioradialis: 2+  Left brachioradialis: 2+  Right biceps: 2+  Left biceps: 2+  Right triceps: 2+  Left triceps: 2+  Right patellar: 2+  Left patellar: 2+  Right achilles: 2+  Left achilles: 2+  Right plantar: normal  Left plantar: normal      Physical Exam   Constitutional: She is oriented to person, place, and time. She appears well-developed and well-nourished.   HENT:   Head: Normocephalic and atraumatic.   Neck: Normal range of motion. Neck supple. Muscular tenderness (Mild left paraspinal muscle tenderness with full range of motion) present. Carotid bruit is not present.   Neurological: She is oriented to person, place, and time. She has normal strength. She has  a normal Finger-Nose-Finger Test and a normal Romberg Test. Gait normal.   Reflex Scores:       Tricep reflexes are 2+ on the right side and 2+ on the left side.       Bicep reflexes are 2+ on the right side and 2+ on the left side.       Brachioradialis reflexes are 2+ on the right side and 2+ on the left side.       Patellar reflexes are 2+ on the right side and 2+ on the left side.       Achilles reflexes are 2+ on the right side and 2+ on the left side.  Psychiatric: Her speech is normal.   Vitals reviewed.        Assessment:        1. Episodic lightheadedness    2. Near syncope    3. Nonintractable episodic headache, unspecified headache type            Plan:       Discussed with patient and her sister who was present today.  Her episodes of lightheadedness mostly related to postural changes may suggest orthostatic hypotension however the possibility of cardiac rhythm disturbance needs to be ruled out.  Advised to keep her cardiology appointment.  Differential if workup is negative would include the possibility of a vertiginous migraine.  She does have history of headaches that have the description of mild migraines without aura.  CT scan of the head results were reviewed with the patient.  This was normal.  Advised patient to initiate the magnesium oxide 250 mg OTC supplementation daily at bedtime for headache prophylaxis.  She will contact me once cardiology workup is completed and she is seen by her primary care physician for a medical workup.

## 2017-11-21 NOTE — PROGRESS NOTES
Cardiology Clinic Note  Reason for Visit: Lightheadedness, left sided numbness    HPI:   27 y.o female with no significant PMHx that presents for evaluation for lightheadedness.    Patient reports that over the last month, she has had multiple episodes of lightheadedness associated with HA. These episodes have become more frequent over the last few weeks and she was evaluated in the ER on 11/18. Initial work-up which included labs and a head CT showed no acute abnormalities. She was given IVFs during that stay with improvement of her sx. She reports despite that, she has had episodes, lasting from minutes to hours occurring over the last week. She reports no association with position. Denies any visual symptoms or aura but reports that she has had problems with speech and left sided numbness and unsteadiness when these episodes happen. She reports nothing improves the pain and denies any recent life stressors.     She denies any chest pain, palpitations, syncope, PND, orthopnea, LE edema.    She denies a family history of SCA or HOCM. 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    ROS:    Constitution: Negative for fever or chills. Negative for weight loss or gain.   HENT: Negative for sore throat. Negative for rhinorrhea. +HA  Eyes: Negative for blurred or double vision.   Cardiovascular: See above  Pulmonary: Negative for SOB. Negative for cough.   Gastrointestinal: Negative for abdominal pain. Negative for nausea/ vomiting. Negative for diarrhea.   : Negative for dysuria.   Neurological: Reports HA with associated numbness and dysarthria.   PMH:     Past Medical History:   Diagnosis Date    Abnormal Pap smear of cervix      Past Surgical History:   Procedure Laterality Date    APPENDECTOMY       Allergies:   Review of patient's allergies indicates:  No Known Allergies  Medications:     No current outpatient prescriptions on file prior to visit.     No current  "facility-administered medications on file prior to visit.      Social History:     Social History   Substance Use Topics    Smoking status: Never Smoker    Smokeless tobacco: Never Used    Alcohol use Yes     Family History:     Family History   Problem Relation Age of Onset    Breast cancer Neg Hx     Colon cancer Neg Hx     Ovarian cancer Neg Hx     Cancer Neg Hx      Physical Exam:   /68 (BP Location: Left arm, Patient Position: Sitting, BP Method: Small (Automatic))   Pulse 77   Ht 5' 3" (1.6 m)   Wt 48.7 kg (107 lb 5.8 oz)   LMP 10/30/2017   BMI 19.02 kg/m²      Constitutional: NAD, conversant  HEENT: Sclera anicteric, EOMI, OP clear  Neck: No JVD, no carotid bruits  CV: RRR, no m/r/g, normal S1/S2  Pulm: CTAB, no wheezes, rales, or ronchi  GI: Abdomen soft, NTND, +BS  Extremities: No LE edema, warm with palpable pulses  Skin: No ecchymosis, erythema, or ulcers  Psych: AOx3, appropriate affect  Neuro: No focal deficits    Labs:     Lab Results   Component Value Date     2017    K 3.8 2017     2017    CO2 27 2017    BUN 13 2017    CREATININE 0.7 2017    ANIONGAP 7 (L) 2017     Lab Results   Component Value Date    AST 18 2017    ALT 17 2017    ALKPHOS 51 (L) 2017    BILITOT 0.4 2017    ALBUMIN 4.1 2017     Lab Results   Component Value Date    CALCIUM 9.9 2017    MG 1.9 2017    PHOS 3.0 2017     No results found for: BNP, BNPTRIAGEBLO Lab Results   Component Value Date    WBC 7.18 2017    HGB 13.3 2017    HCT 39.2 2017     2017    GRAN 4.3 2017    GRAN 60.1 2017     No results found for: PTT, INR  No results found for: CHOL, HDL, LDLCALC, TRIG  No results found for: HGBA1C  Lab Results   Component Value Date    TSH 0.871 2017          Imaging:       No results found for: EF    EK17  NSR, LAFB, RAD  Assessment and Plan:    27 y.o female " with no significant PMHx that presents for evaluation for lightheadedness. Her symptoms do not sound arrythmogenic in nature. No family history of HOCM, however, family history of PDA. Physical exam revealed no physical exam findings suggestive of shunting. However, her symptoms (along with EKG findings) could be consistent with ASD and TIAs as a result. Therefore, will pursue Holter and 2D echo w/ CFD and bubble study to evaluate for ASD. She was seen by neurology today who attribute her sx to possible migraines. Will perform work-up to r/o any cardiac etiology of her symptoms and findings.    Lightheadedness   Family hx of ASD  - 2D echo w/ CFD and bubble study to r/o ASD  - 24 Holter to r/o any arrhythmogenic causes of lightheadedness    Patient seen and discussed with Dr. Donnelly    Signed:  William Mejia MD  Cardiovascular Disease Fellow, PGY-V  Pager: [phone]  11/21/2017 4:35 PM    Follow-up:   Future Appointments  Date Time Provider Department Center   12/5/2017 10:20 AM Kathleen Matthews MD [facility]   12/5/2017 10:40 AM HOLTER, EKG [facility] ARR[facility] Arjun Damon   12/5/2017 3:15 PM ECHO, [facility] E[facility] Arjun Damon

## 2017-11-22 ENCOUNTER — TELEPHONE (OUTPATIENT)
Dept: CARDIOLOGY | Facility: CLINIC | Age: 27
End: 2017-11-22

## 2017-11-22 NOTE — TELEPHONE ENCOUNTER
Was seen today and was feeling fine for most of day. Was seen for dizziness for 2 weeks and was feeling faint. Was seen in ED on Saturday. Was told to see a cardiologist and neurologist. Had some test ordered. Now feeling faint again. Can't sit or stand and can't talk well and left side is hurting.Feels weak and this happens every Advised to ED for present symptoms. Caller refusing at this time and is requesting ordered test be done sooner. Advised that would have to be discussed with MD during office hours but advised to ED at this time.     Reason for Disposition   Patient sounds very sick or weak to the triager    Protocols used: ST RECENT MEDICAL VISIT FOR ILLNESS FOLLOW-UP CALL-A-AH

## 2017-11-22 NOTE — TELEPHONE ENCOUNTER
Spoke with pt regarding chest discomfort. Per Dr. Soto if symptoms worsen to present to the ER for expedited evaluation. Pt. stated she did not need to go to the ER at this time.  Pt verbalized understanding

## 2017-11-22 NOTE — TELEPHONE ENCOUNTER
Spoke to patient who has been advised to go to the ER for current symptoms. She says she will go if she feels no better later on today.

## 2017-12-01 ENCOUNTER — PATIENT MESSAGE (OUTPATIENT)
Dept: OBSTETRICS AND GYNECOLOGY | Facility: HOSPITAL | Age: 27
End: 2017-12-01

## 2017-12-05 ENCOUNTER — HOSPITAL ENCOUNTER (OUTPATIENT)
Dept: CARDIOLOGY | Facility: CLINIC | Age: 27
Discharge: HOME | End: 2017-12-05
Attending: INTERNAL MEDICINE
Payer: COMMERCIAL

## 2017-12-05 ENCOUNTER — CLINICAL SUPPORT (OUTPATIENT)
Dept: ELECTROPHYSIOLOGY | Facility: CLINIC | Age: 27
End: 2017-12-05
Attending: INTERNAL MEDICINE
Payer: COMMERCIAL

## 2017-12-05 ENCOUNTER — TELEPHONE (OUTPATIENT)
Dept: CARDIOLOGY | Facility: CLINIC | Age: 27
End: 2017-12-05

## 2017-12-05 DIAGNOSIS — Z82.79 FAMILY HISTORY OF PDA (PATENT DUCTUS ARTERIOSUS): ICD-10-CM

## 2017-12-05 DIAGNOSIS — R42 EPISODIC LIGHTHEADEDNESS: ICD-10-CM

## 2017-12-05 LAB
DIASTOLIC DYSFUNCTION: NO
ESTIMATED PA SYSTOLIC PRESSURE: 28.81
MITRAL VALVE MOBILITY: NORMAL
RETIRED EF AND QEF - SEE NOTES: 60 (ref 55–65)

## 2017-12-05 PROCEDURE — 93306 TTE W/DOPPLER COMPLETE: CPT | Mod: S$GLB,,, | Performed by: INTERNAL MEDICINE

## 2017-12-05 PROCEDURE — 93224 XTRNL ECG REC UP TO 48 HRS: CPT | Mod: S$GLB,,, | Performed by: INTERNAL MEDICINE

## 2017-12-05 NOTE — TELEPHONE ENCOUNTER
Called patient regarding results of echo. Normal echo without evidence of intracardiac shunt. Will f/u Holter results once completed.

## 2017-12-07 ENCOUNTER — TELEPHONE (OUTPATIENT)
Dept: CARDIOLOGY | Facility: CLINIC | Age: 27
End: 2017-12-07

## 2017-12-08 NOTE — TELEPHONE ENCOUNTER
Patient called regarding results of Holter monitor. No significant arrhythmias found on Holter. Cardiac work-up complete as cause for dizziness / HA. Continue work-up and treatment as per neurology. FU PRN

## 2017-12-11 ENCOUNTER — TELEPHONE (OUTPATIENT)
Dept: FAMILY MEDICINE | Facility: CLINIC | Age: 27
End: 2017-12-11

## 2017-12-11 NOTE — TELEPHONE ENCOUNTER
----- Message from Petra Mckeon sent at 12/11/2017 10:58 AM CST -----  Contact: Pt  x_  1st Request  _  2nd Request  _  3rd Request        Who: Pt    Why: Requesting a call back in regards to Pt says she's been having Dizzy spells for 3 weeks She said she has been seen by a Cardiologist as well as a Neurologist which she is scheduled to see on Thursday.She would like to see Dr Byron SEGAL because they can't find anything wrong.      What Number to Call Back: [phone]    When to Expect a call back: (Within 24 hours)    Please return the call at earliest convenience. Thanks!

## 2017-12-12 ENCOUNTER — PATIENT MESSAGE (OUTPATIENT)
Dept: FAMILY MEDICINE | Facility: CLINIC | Age: 27
End: 2017-12-12

## 2017-12-12 ENCOUNTER — OFFICE VISIT (OUTPATIENT)
Dept: NEUROLOGY | Facility: CLINIC | Age: 27
End: 2017-12-12
Payer: COMMERCIAL

## 2017-12-12 ENCOUNTER — OFFICE VISIT (OUTPATIENT)
Dept: FAMILY MEDICINE | Facility: CLINIC | Age: 27
End: 2017-12-12
Attending: FAMILY MEDICINE
Payer: COMMERCIAL

## 2017-12-12 ENCOUNTER — TELEPHONE (OUTPATIENT)
Dept: FAMILY MEDICINE | Facility: CLINIC | Age: 27
End: 2017-12-12

## 2017-12-12 VITALS
SYSTOLIC BLOOD PRESSURE: 92 MMHG | HEART RATE: 77 BPM | WEIGHT: 107.31 LBS | BODY MASS INDEX: 19.01 KG/M2 | HEIGHT: 63 IN | DIASTOLIC BLOOD PRESSURE: 68 MMHG | RESPIRATION RATE: 16 BRPM

## 2017-12-12 VITALS
DIASTOLIC BLOOD PRESSURE: 63 MMHG | HEART RATE: 57 BPM | SYSTOLIC BLOOD PRESSURE: 96 MMHG | BODY MASS INDEX: 19.1 KG/M2 | WEIGHT: 107.81 LBS

## 2017-12-12 DIAGNOSIS — R51.9 NONINTRACTABLE EPISODIC HEADACHE, UNSPECIFIED HEADACHE TYPE: Primary | ICD-10-CM

## 2017-12-12 DIAGNOSIS — R42 DIZZINESS: ICD-10-CM

## 2017-12-12 DIAGNOSIS — R42 LIGHTHEADEDNESS: Primary | ICD-10-CM

## 2017-12-12 DIAGNOSIS — R42 EPISODIC LIGHTHEADEDNESS: ICD-10-CM

## 2017-12-12 PROCEDURE — 99214 OFFICE O/P EST MOD 30 MIN: CPT | Mod: S$GLB,,, | Performed by: PSYCHIATRY & NEUROLOGY

## 2017-12-12 PROCEDURE — 99213 OFFICE O/P EST LOW 20 MIN: CPT | Mod: S$GLB,,, | Performed by: FAMILY MEDICINE

## 2017-12-12 PROCEDURE — 99999 PR PBB SHADOW E&M-EST. PATIENT-LVL III: CPT | Mod: PBBFAC,,, | Performed by: FAMILY MEDICINE

## 2017-12-12 PROCEDURE — 99999 PR PBB SHADOW E&M-EST. PATIENT-LVL III: CPT | Mod: PBBFAC,,, | Performed by: PSYCHIATRY & NEUROLOGY

## 2017-12-12 RX ORDER — ALPRAZOLAM 0.25 MG/1
TABLET ORAL
COMMUNITY
Start: 2017-11-30 | End: 2018-01-25

## 2017-12-12 RX ORDER — VENLAFAXINE HYDROCHLORIDE 37.5 MG/1
37.5 CAPSULE, EXTENDED RELEASE ORAL DAILY
Qty: 30 CAPSULE | Refills: 3 | Status: SHIPPED | OUTPATIENT
Start: 2017-12-12 | End: 2018-01-25

## 2017-12-12 RX ORDER — FLUTICASONE PROPIONATE 50 MCG
SPRAY, SUSPENSION (ML) NASAL
COMMUNITY
Start: 2017-11-30 | End: 2018-01-25

## 2017-12-12 NOTE — TELEPHONE ENCOUNTER
----- Message from Key Daley sent at 12/12/2017  2:47 PM CST -----  Contact: Patient herself  X  1st Request  _  2nd Request  _  3rd Request    Who: Ramona Marino (mrn# [id number])    Why: Patient called requesting a doctor's note excusing her for two weeks from her job.  Please do so at your earliest convenience.  Thanks!    What Number to Call Back: [phone]    When to Expect a call back: (Before the end of the day)   -- if the call is after 12:00, the call back will be tomorrow.

## 2017-12-12 NOTE — PROGRESS NOTES
Subjective:       Patient ID: Ramona Dukes is a 27 y.o. female.    Chief Complaint:  Dizziness      History of Present Illness  HPI   This is a 27-year-old female who had been seen by me with symptoms for episodic lightheadedness.  The patient had been initially seen at the emergency room a couple of months ago and was then referred for cardiology and neurology evaluation.  Symptoms were initially mild however more recently she reports that theyve been present almost on a daily basis and that she feels as if she is going to pass out and has to sit down or rest to feel better.  She also notes that on a couple of occasions she has had a pressure-like sensation in her chest with radiation to the neck.  She denies any palpitations.  She has had left-sided neck pain off and on in the past.  The symptoms are not associated with true vertigo, diplopia or other visual symptoms or headaches.      The patient does have a history of chronic headaches that are usually frontotemporal occurring about once or twice a month, pressure-like lasting for about an hour or 2.  They usually respond to OTC medications.  The patient had seen an eye doctor when she was getting these headaches as she does wear eyeglasses since childhood however the eye examination was normal.  When seen at the emergency room she had a CT scan of the brain done that was normal.  Lab work done was also reviewed and was essentially normal including a toxicology screen.  The patient does admit to some stress at work as she works at the  at a local [employer], occasionally working long hours.  She did report that she did take a medical leave of absence because of the symptoms is the symptoms were getting worse at work.  Cardiology workup was essentially unremarkable.  She does report that she has an appointment to see ENT later this week.       Review of Systems  Review of Systems   Constitutional: Negative.    HENT: Negative.  Negative for hearing loss.     Eyes: Negative.  Negative for visual disturbance.   Respiratory: Negative.  Negative for shortness of breath.    Cardiovascular: Negative.  Negative for chest pain and palpitations.   Gastrointestinal: Negative.    Genitourinary: Negative.    Musculoskeletal: Negative.  Negative for back pain, gait problem and neck pain.   Skin: Negative.    Neurological: Positive for dizziness, light-headedness and headaches. Negative for tremors, seizures, syncope, speech difficulty, weakness and numbness.   Psychiatric/Behavioral: Negative.  Negative for confusion and decreased concentration.       Objective:      Neurologic Exam     Mental Status   Oriented to person, place, and time.   Registration: recalls 3 of 3 objects. Follows 3 step commands.   Attention: normal. Concentration: normal.   Speech: speech is normal   Level of consciousness: alert  Knowledge: good.   Able to name object. Able to read. Able to repeat. Able to write. Normal comprehension.     Cranial Nerves   Cranial nerves II through XII intact.     Motor Exam   Muscle bulk: normal  Overall muscle tone: normal    Strength   Strength 5/5 throughout.     Sensory Exam   Light touch normal.   Proprioception normal.   Pinprick normal.     Gait, Coordination, and Reflexes     Gait  Gait: normal    Coordination   Romberg: negative  Finger to nose coordination: normal    Tremor   Resting tremor: absent  Intention tremor: absent  Action tremor: absent    Reflexes   Right brachioradialis: 2+  Left brachioradialis: 2+  Right biceps: 2+  Left biceps: 2+  Right triceps: 2+  Left triceps: 2+  Right patellar: 2+  Left patellar: 2+  Right achilles: 2+  Left achilles: 2+  Right plantar: normal  Left plantar: normal      Physical Exam   Constitutional: She is oriented to person, place, and time. She appears well-developed and well-nourished.   HENT:   Head: Normocephalic and atraumatic.   Neck: Normal range of motion. Neck supple. Muscular tenderness (Mild left paraspinal  muscle tenderness with full range of motion) present. Carotid bruit is not present.   Neurological: She is oriented to person, place, and time. She has normal strength. She has a normal Finger-Nose-Finger Test and a normal Romberg Test. Gait normal.   Reflex Scores:       Tricep reflexes are 2+ on the right side and 2+ on the left side.       Bicep reflexes are 2+ on the right side and 2+ on the left side.       Brachioradialis reflexes are 2+ on the right side and 2+ on the left side.       Patellar reflexes are 2+ on the right side and 2+ on the left side.       Achilles reflexes are 2+ on the right side and 2+ on the left side.  Psychiatric: Her speech is normal.   Vitals reviewed.        Assessment:        1. Nonintractable episodic headache, unspecified headache type    2. Episodic lightheadedness    3. Dizziness            Plan:       Discussed with patient.  Her episodes of lightheadedness mostly related to postural changes may suggest orthostatic hypotension however cardiology workup was essentially unremarkable.  Differential if workup is negative would include the possibility of a vertiginous migraine.  She does have history of headaches that have the description of mild migraines without aura.  Agree with ENT evaluation and opinion regarding her symptoms.  Additional factors would include the possibility of anxiety.  Will initiate venlafaxine XR 37.5 mg daily at bedtime.  This will help with migraine prophylaxis and will also help with anxiety symptoms.  She may continue with OTC headache medication.  She will be seen by me in follow-up in one month.

## 2017-12-12 NOTE — PATIENT INSTRUCTIONS
Discussed with patient.  Her episodes of lightheadedness mostly related to postural changes may suggest orthostatic hypotension however cardiology workup was essentially unremarkable.  Differential if workup is negative would include the possibility of a vertiginous migraine.  She does have history of headaches that have the description of mild migraines without aura.  Agree with ENT evaluation and opinion regarding her symptoms.  Additional factors would include the possibility of anxiety.  Will initiate venlafaxine XR 37.5 mg daily at bedtime.  This will help with migraine prophylaxis and will also help with anxiety symptoms.  She may continue with OTC headache medication.

## 2017-12-18 NOTE — PROGRESS NOTES
Subjective:       Patient ID: Ramona Dukes is a 27 y.o. female.    Chief Complaint: Dizziness (x 1 month)    HPI   Pt is here for c/o light headedness no acute event typically at work she denies sinus symptoms she has h/o migraines sees neurology for both of these complaints  She tries to drink plenty of water.  Her work asked her to take some time off. Labs okay neg head ct.   Review of Systems   Constitutional: Negative for activity change, chills, fatigue, fever and unexpected weight change.   HENT: Negative for hearing loss, rhinorrhea and trouble swallowing.    Eyes: Negative for discharge and visual disturbance.   Respiratory: Negative for chest tightness and wheezing.    Cardiovascular: Negative for chest pain and palpitations.   Gastrointestinal: Negative for blood in stool, constipation, diarrhea and vomiting.   Endocrine: Negative for polydipsia and polyuria.   Genitourinary: Negative for difficulty urinating, dysuria, hematuria and menstrual problem.   Musculoskeletal: Negative for arthralgias, joint swelling and neck pain.   Neurological: Positive for light-headedness and headaches. Negative for weakness.   Psychiatric/Behavioral: Positive for dysphoric mood. Negative for confusion.       Objective:      Physical Exam   Constitutional: She appears well-developed and well-nourished. No distress.   HENT:   Head: Normocephalic and atraumatic.   Nose: Nose normal.   Mouth/Throat: Oropharynx is clear and moist. No oropharyngeal exudate.   Tm pearly bilaterally   Cardiovascular: Normal rate and regular rhythm.  Exam reveals no gallop.    Pulmonary/Chest: Effort normal and breath sounds normal. No respiratory distress. She has no rales.   Abdominal: Soft. Bowel sounds are normal. She exhibits no distension and no mass. There is no tenderness.       Assessment:       1. Lightheadedness        Plan:     discussed pt meds can cause dizziness    orders labs declined  Pt will increase her water intake  F/u  "neurology and cardiology as directed  Pt to make a log of her symptoms and the surrounding events    "This note will not be shared with the patient."   "

## 2018-01-25 ENCOUNTER — LAB VISIT (OUTPATIENT)
Dept: LAB | Facility: HOSPITAL | Age: 28
End: 2018-01-25
Attending: FAMILY MEDICINE
Payer: COMMERCIAL

## 2018-01-25 ENCOUNTER — OFFICE VISIT (OUTPATIENT)
Dept: FAMILY MEDICINE | Facility: CLINIC | Age: 28
End: 2018-01-25
Attending: FAMILY MEDICINE
Payer: COMMERCIAL

## 2018-01-25 ENCOUNTER — CLINICAL SUPPORT (OUTPATIENT)
Dept: INFECTIOUS DISEASES | Facility: CLINIC | Age: 28
End: 2018-01-25
Payer: COMMERCIAL

## 2018-01-25 VITALS
WEIGHT: 108.88 LBS | OXYGEN SATURATION: 99 % | RESPIRATION RATE: 16 BRPM | SYSTOLIC BLOOD PRESSURE: 96 MMHG | BODY MASS INDEX: 19.29 KG/M2 | DIASTOLIC BLOOD PRESSURE: 68 MMHG | HEIGHT: 63 IN | HEART RATE: 66 BPM

## 2018-01-25 DIAGNOSIS — Z11.3 SCREEN FOR STD (SEXUALLY TRANSMITTED DISEASE): ICD-10-CM

## 2018-01-25 DIAGNOSIS — Z11.1 SCREENING-PULMONARY TB: ICD-10-CM

## 2018-01-25 DIAGNOSIS — Z23 NEED FOR VACCINATION: Primary | ICD-10-CM

## 2018-01-25 PROCEDURE — 90746 HEPB VACCINE 3 DOSE ADULT IM: CPT | Mod: S$GLB,,, | Performed by: INTERNAL MEDICINE

## 2018-01-25 PROCEDURE — 90707 MMR VACCINE SC: CPT | Mod: S$GLB,,, | Performed by: INTERNAL MEDICINE

## 2018-01-25 PROCEDURE — 90716 VAR VACCINE LIVE SUBQ: CPT | Mod: S$GLB,,, | Performed by: INTERNAL MEDICINE

## 2018-01-25 PROCEDURE — 90651 9VHPV VACCINE 2/3 DOSE IM: CPT | Mod: S$GLB,,, | Performed by: INTERNAL MEDICINE

## 2018-01-25 PROCEDURE — 90734 MENACWYD/MENACWYCRM VACC IM: CPT | Mod: S$GLB,,, | Performed by: INTERNAL MEDICINE

## 2018-01-25 PROCEDURE — 99213 OFFICE O/P EST LOW 20 MIN: CPT | Mod: S$GLB,,, | Performed by: FAMILY MEDICINE

## 2018-01-25 PROCEDURE — 90471 IMMUNIZATION ADMIN: CPT | Mod: S$GLB,,, | Performed by: INTERNAL MEDICINE

## 2018-01-25 PROCEDURE — 90632 HEPA VACCINE ADULT IM: CPT | Mod: S$GLB,,, | Performed by: INTERNAL MEDICINE

## 2018-01-25 PROCEDURE — 86592 SYPHILIS TEST NON-TREP QUAL: CPT

## 2018-01-25 PROCEDURE — 99999 PR PBB SHADOW E&M-EST. PATIENT-LVL III: CPT | Mod: PBBFAC,,, | Performed by: FAMILY MEDICINE

## 2018-01-25 PROCEDURE — 90472 IMMUNIZATION ADMIN EACH ADD: CPT | Mod: S$GLB,,, | Performed by: INTERNAL MEDICINE

## 2018-01-25 RX ORDER — VENLAFAXINE HYDROCHLORIDE 37.5 MG/1
37.5 CAPSULE, EXTENDED RELEASE ORAL DAILY
COMMUNITY
End: 2018-01-25

## 2018-01-25 NOTE — PROGRESS NOTES
Pt received her immunizations (Hepatitis A, Hepatitis B, HPV #2, Menactra Meningococcal, MMR, and Varicella). Pt updated that the Hepatitis A and B vaccinations are usually part of a vaccination series. Pt reports she will f/o with her MD. Pt scheduled for a return appt in late May for the final dose of her HPV vaccination. Immunization report printed per pt request. Pt left the unit in NAD.

## 2018-01-25 NOTE — PATIENT INSTRUCTIONS
Your test results will be communicated to you via : [facility], Telephone or Letter.   If you have not received your test results in one week, please contact the clinic at [phone].

## 2018-01-26 DIAGNOSIS — R76.12 POSITIVE QUANTIFERON-TB GOLD TEST: Primary | ICD-10-CM

## 2018-01-26 LAB — RPR SER QL: NORMAL

## 2018-01-29 ENCOUNTER — HOSPITAL ENCOUNTER (OUTPATIENT)
Dept: RADIOLOGY | Facility: OTHER | Age: 28
Discharge: HOME | End: 2018-01-29
Attending: FAMILY MEDICINE
Payer: COMMERCIAL

## 2018-01-29 DIAGNOSIS — R76.12 POSITIVE QUANTIFERON-TB GOLD TEST: ICD-10-CM

## 2018-01-29 PROCEDURE — 71046 X-RAY EXAM CHEST 2 VIEWS: CPT | Mod: TC,FY

## 2018-01-29 PROCEDURE — 71046 X-RAY EXAM CHEST 2 VIEWS: CPT | Mod: 26,,, | Performed by: RADIOLOGY

## 2018-01-30 NOTE — PROGRESS NOTES
"Subjective:       Patient ID: Ramona Dukes is a 27 y.o. female.    Chief Complaint: Immunizations    HPI   Pt is here for immunization update and screenings for work pt had pos ppd requests tb gold pt denies fatigue no weight loss no sob/cp no night sweats she is unsure of her vaccination status  Review of Systems   Constitutional: Negative for activity change, chills, fatigue, fever and unexpected weight change.   HENT: Negative for hearing loss, rhinorrhea and trouble swallowing.    Eyes: Negative for discharge and visual disturbance.   Respiratory: Negative for chest tightness and wheezing.    Cardiovascular: Negative for chest pain and palpitations.   Gastrointestinal: Negative for blood in stool, constipation, diarrhea and vomiting.   Endocrine: Negative for polydipsia and polyuria.   Genitourinary: Negative for difficulty urinating, dysuria, hematuria and menstrual problem.   Musculoskeletal: Negative for arthralgias, joint swelling and neck pain.   Neurological: Negative for weakness and headaches.   Psychiatric/Behavioral: Negative for confusion and dysphoric mood.       Objective:      Physical Exam   Constitutional: She appears well-developed and well-nourished. No distress.   Cardiovascular: Normal rate and regular rhythm.  Exam reveals no gallop.    Pulmonary/Chest: Effort normal and breath sounds normal. No respiratory distress. She has no wheezes. She has no rales.   Abdominal: Soft. Bowel sounds are normal. She exhibits no distension.       Assessment:       1. Need for vaccination    2. Screening-pulmonary TB    3. Screen for STD (sexually transmitted disease)        Plan:     orders tb gold, vaccinations, rpr    low fat diet  Graded exercise  rtc prn     "This note will not be shared with the patient."   "

## 2018-03-12 ENCOUNTER — TELEPHONE (OUTPATIENT)
Dept: FAMILY MEDICINE | Facility: CLINIC | Age: 28
End: 2018-03-12

## 2018-03-12 NOTE — TELEPHONE ENCOUNTER
----- Message from Gayle Gallego sent at 3/12/2018 11:11 AM CDT -----  Contact: pt  _  1st Request  _  2nd Request  _  3rd Request        Who: pt    Why: Requesting a call back in regards to would like to make appt for this week for dizziness and panic attacks. First available is 4/11/18. Please call pt   Please return the call at earliest convenience. Thanks!    What Number to Call Back:[phone]      When to Expect a call back: (Within 24 hours)

## 2018-03-16 ENCOUNTER — OFFICE VISIT (OUTPATIENT)
Dept: FAMILY MEDICINE | Facility: CLINIC | Age: 28
End: 2018-03-16
Attending: FAMILY MEDICINE
Payer: COMMERCIAL

## 2018-03-16 VITALS
HEART RATE: 76 BPM | HEIGHT: 63 IN | DIASTOLIC BLOOD PRESSURE: 68 MMHG | WEIGHT: 108.63 LBS | SYSTOLIC BLOOD PRESSURE: 104 MMHG | RESPIRATION RATE: 16 BRPM | BODY MASS INDEX: 19.25 KG/M2

## 2018-03-16 DIAGNOSIS — F41.9 ANXIETY: Primary | ICD-10-CM

## 2018-03-16 DIAGNOSIS — R51.9 WORSENING HEADACHES: ICD-10-CM

## 2018-03-16 DIAGNOSIS — G43.009 MIGRAINE WITHOUT AURA AND WITHOUT STATUS MIGRAINOSUS, NOT INTRACTABLE: ICD-10-CM

## 2018-03-16 PROCEDURE — 99213 OFFICE O/P EST LOW 20 MIN: CPT | Mod: S$GLB,,, | Performed by: FAMILY MEDICINE

## 2018-03-16 PROCEDURE — 99999 PR PBB SHADOW E&M-EST. PATIENT-LVL III: CPT | Mod: PBBFAC,,, | Performed by: FAMILY MEDICINE

## 2018-03-16 RX ORDER — ESCITALOPRAM OXALATE 10 MG/1
10 TABLET ORAL DAILY
Qty: 30 TABLET | Refills: 1 | Status: SHIPPED | OUTPATIENT
Start: 2018-03-16 | End: 2019-08-07 | Stop reason: ALTCHOICE

## 2018-03-16 NOTE — PATIENT INSTRUCTIONS
Your test results will be communicated to you via : [facility], Telephone or Letter.   If you have not received your test results in one week, please contact the clinic at [phone].

## 2018-03-26 NOTE — PROGRESS NOTES
"Subjective:       Patient ID: Ramona Dukes is a 27 y.o. female.    Chief Complaint: Anxiety    HPI   Pt is here for c/o anxiety pt denies depression however her anxiety makes her sad at times no anhedonia pt has panic attacks with palpitations and dizziness but her not always consistently together  Pt notices her symptoms mainly at work increased stress pt also has migraine headaches worsening and coming more often she woul dlike to see neurology   Review of Systems   Constitutional: Negative for chills, fatigue and fever.   Respiratory: Negative for cough, chest tightness and shortness of breath.    Cardiovascular: Positive for palpitations. Negative for chest pain.   Gastrointestinal: Negative for abdominal distention, abdominal pain and blood in stool.   Neurological: Positive for dizziness. Negative for syncope, weakness and numbness.   Psychiatric/Behavioral: Positive for dysphoric mood. Negative for behavioral problems, self-injury and suicidal ideas. The patient is nervous/anxious.        Objective:      Physical Exam   Constitutional: She is oriented to person, place, and time. She appears well-developed and well-nourished. No distress.   Cardiovascular: Normal rate and regular rhythm.  Exam reveals no gallop.    Pulmonary/Chest: Effort normal and breath sounds normal. No respiratory distress. She has no rales.   Abdominal: Soft. Bowel sounds are normal. She exhibits no distension. There is no tenderness.   Neurological: She is oriented to person, place, and time. No cranial nerve deficit. Coordination normal.   Skin: Skin is dry.   Psychiatric: She has a normal mood and affect. Her behavior is normal. Judgment and thought content normal.     labs discussed with pt   Assessment:       1. Anxiety    2. Migraine headaches    3. Worsening headaches        Plan:     orders labs declined tsh fine recently   f/u neurology  Start lexapro   rtc 1 month     "This note will not be shared with the patient."   "

## 2018-04-18 ENCOUNTER — OFFICE VISIT (OUTPATIENT)
Dept: NEUROLOGY | Facility: CLINIC | Age: 28
End: 2018-04-18
Payer: COMMERCIAL

## 2018-04-18 VITALS
WEIGHT: 106.69 LBS | SYSTOLIC BLOOD PRESSURE: 95 MMHG | BODY MASS INDEX: 18.9 KG/M2 | DIASTOLIC BLOOD PRESSURE: 63 MMHG | HEART RATE: 72 BPM | HEIGHT: 63 IN

## 2018-04-18 DIAGNOSIS — F41.3 OTHER MIXED ANXIETY DISORDERS: ICD-10-CM

## 2018-04-18 DIAGNOSIS — G43.109 MIGRAINE WITH AURA AND WITHOUT STATUS MIGRAINOSUS, NOT INTRACTABLE: ICD-10-CM

## 2018-04-18 PROCEDURE — 99999 PR PBB SHADOW E&M-EST. PATIENT-LVL III: CPT | Mod: PBBFAC,,, | Performed by: PSYCHIATRY & NEUROLOGY

## 2018-04-18 PROCEDURE — 99214 OFFICE O/P EST MOD 30 MIN: CPT | Mod: S$GLB,,, | Performed by: PSYCHIATRY & NEUROLOGY

## 2018-04-18 RX ORDER — TRAZODONE HYDROCHLORIDE 50 MG/1
50 TABLET ORAL NIGHTLY
Qty: 30 TABLET | Refills: 1 | Status: SHIPPED | OUTPATIENT
Start: 2018-04-18 | End: 2019-08-07 | Stop reason: ALTCHOICE

## 2018-04-18 NOTE — PATIENT INSTRUCTIONS
Discussed with patient.  Her present headaches are consistent with migraines with aura.  Advised patient to continue with the Lexapro and discussed with her primary care regarding any changes in dosing.  We will initiate trazodone 50 mg at bedtime daily which will help her headaches as well as help with sleep as she does report that his sleep is somewhat disturbed.  In addition she is advised to restart OTC magnesium oxide 250 mg at bedtime daily for headache prophylaxis.  She may continue with OTC headache medication.

## 2018-04-18 NOTE — PROGRESS NOTES
Subjective:       Patient ID: Ramona Dukes is a 27 y.o. female.    Chief Complaint:  Headache      History of Present Illness  HPI   This is a 27-year-old female who returns in follow-up of her headaches.  She reports that since her last visit she has been having increasing headaches for the last months usually triggered by anxiety and panic attacks.  Her present headaches are preceded by visual phenomenon including zigzag colored lights and scotomas.  These are usually followed by headache that is bitemporal and frontal and throbbing when severe but without associated focal neurological or visual symptoms she has been taking OTC medications which do help however she reports that the frequency of these headaches have been increasing. CT scan of the brain done that was normal.  She had tried venlafaxine in the past for headache prophylaxis but could not tolerate the medication.    She admits seen by me for episodic lightheadedness however this has improved.  She has been having increasing anxiety attacks and panic attacks and had been seen by her primary care physician recently and was started on escitalopram 10 mg daily.  The patient does admit to some stress at work as she works at the  at a local hotel, occasionally working long hours.  She is very excited because she will be starting [employer] school in [place] as she was accepted at the [employer].       Review of Systems  Review of Systems   Constitutional: Negative.    HENT: Negative.  Negative for hearing loss.    Eyes: Negative.  Negative for visual disturbance.   Respiratory: Negative.  Negative for shortness of breath.    Cardiovascular: Negative.  Negative for chest pain and palpitations.   Gastrointestinal: Negative.    Genitourinary: Negative.    Musculoskeletal: Negative.  Negative for back pain, gait problem and neck pain.   Skin: Negative.    Neurological: Positive for dizziness, light-headedness and headaches. Negative for  tremors, seizures, syncope, speech difficulty, weakness and numbness.   Psychiatric/Behavioral: Negative.  Negative for confusion and decreased concentration.       Objective:      Neurologic Exam     Mental Status   Oriented to person, place, and time.   Registration: recalls 3 of 3 objects. Follows 3 step commands.   Attention: normal. Concentration: normal.   Speech: speech is normal   Level of consciousness: alert  Knowledge: good.   Able to name object. Able to read. Able to repeat. Able to write. Normal comprehension.     Cranial Nerves   Cranial nerves II through XII intact.     Motor Exam   Muscle bulk: normal  Overall muscle tone: normal    Strength   Strength 5/5 throughout.     Sensory Exam   Light touch normal.   Proprioception normal.   Pinprick normal.     Gait, Coordination, and Reflexes     Gait  Gait: normal    Coordination   Romberg: negative  Finger to nose coordination: normal    Tremor   Resting tremor: absent  Intention tremor: absent  Action tremor: absent    Reflexes   Right brachioradialis: 2+  Left brachioradialis: 2+  Right biceps: 2+  Left biceps: 2+  Right triceps: 2+  Left triceps: 2+  Right patellar: 2+  Left patellar: 2+  Right achilles: 2+  Left achilles: 2+  Right plantar: normal  Left plantar: normal      Physical Exam   Constitutional: She is oriented to person, place, and time. She appears well-developed and well-nourished.   HENT:   Head: Normocephalic and atraumatic.   Neck: Normal range of motion. Neck supple. Muscular tenderness (Mild left paraspinal muscle tenderness with full range of motion) present. Carotid bruit is not present.   Neurological: She is oriented to person, place, and time. She has normal strength. She has a normal Finger-Nose-Finger Test and a normal Romberg Test. Gait normal.   Reflex Scores:       Tricep reflexes are 2+ on the right side and 2+ on the left side.       Bicep reflexes are 2+ on the right side and 2+ on the left side.       Brachioradialis  reflexes are 2+ on the right side and 2+ on the left side.       Patellar reflexes are 2+ on the right side and 2+ on the left side.       Achilles reflexes are 2+ on the right side and 2+ on the left side.  Psychiatric: Her speech is normal.   Vitals reviewed.        Assessment:        1. Migraine with aura and without status migrainosus, not intractable    2. Other mixed anxiety disorders            Plan:       Discussed with patient.  Her present headaches are consistent with migraines with aura.  Advised patient to continue with the Lexapro and discussed with her primary care regarding any changes in dosing.  We will initiate trazodone 50 mg at bedtime daily which will help her headaches as well as help with sleep as she does report that his sleep is somewhat disturbed.  In addition she is advised to restart OTC magnesium oxide 250 mg at bedtime daily for headache prophylaxis.  She may continue with OTC headache medication.  She will be seen by me in follow-up in one month.

## 2018-05-01 ENCOUNTER — TELEPHONE (OUTPATIENT)
Dept: NEUROLOGY | Facility: CLINIC | Age: 28
End: 2018-05-01

## 2018-05-01 NOTE — TELEPHONE ENCOUNTER
Called and left message.  Will call back later.  Advised patient to stop the trazodone and I will discuss restarting this later.

## 2018-05-01 NOTE — TELEPHONE ENCOUNTER
----- Message from Fely Su sent at 5/1/2018 10:42 AM CDT -----  Contact: MOOK FINNEY [[id number]]            Name of Who is Calling: MOOK FINNEY [[id number]]      What is the request in detail: Pt is calling to discuss the side effects of the traZODone (DESYREL) 50 MG tablet.  Pt says that it's been giving her nausea at random times of the day and want to know is that normal.  Please contact pt to further discuss and advise.    Can the clinic reply by [facility]: Yes      What [facility] Back [facility]: [phone]

## 2018-05-01 NOTE — TELEPHONE ENCOUNTER
----- Message from Gayle Gallego sent at 5/1/2018  4:48 PM CDT -----  Contact: jacki            Name of Who is Calling: jacki      What is the request in detail: returned the nurse's phone call.      Can the clinic reply by [facility]: no      What [facility] Back [facility]: [phone]

## 2018-05-02 ENCOUNTER — TELEPHONE (OUTPATIENT)
Dept: NEUROLOGY | Facility: CLINIC | Age: 28
End: 2018-05-02

## 2018-05-02 NOTE — TELEPHONE ENCOUNTER
Called patient today and advised her regarding stopping the trazodone to see if the nausea with clear up.  She will call me the next 2-3 days to give me an update at which time he may consider another sleep medicine.

## 2018-05-04 ENCOUNTER — TELEPHONE (OUTPATIENT)
Dept: NEUROLOGY | Facility: CLINIC | Age: 28
End: 2018-05-04

## 2018-05-04 NOTE — TELEPHONE ENCOUNTER
----- Message from Fely Su sent at 5/4/2018  2:51 PM CDT -----  Contact: MOOK FINNEY [[id number]]            Name of Who is Calling: MOOK FINNEY [[id number]]      What is the request in detail: Pt says that she was calling to speak with Dr. Hyde as instructed to do by him to discuss her medication side effects.  Please contact pt to further discuss and advise.    Can the clinic reply by [facility]: No    What Number to Call Back [facility] not in [facility]: [phone]

## 2018-05-04 NOTE — TELEPHONE ENCOUNTER
Called patient.  She reports that the nausea is continued despite being off the Desyrel for 3 days.  Advised her that it may not be a side effect of the other however would continue being off the distal for another 2-3 days and that she could give me a call on Monday.  If symptoms continue then it would definitely not be the Desyrel causing the problem however will discuss this with her on Monday.

## 2019-07-03 ENCOUNTER — OFFICE VISIT (OUTPATIENT)
Dept: OBSTETRICS AND GYNECOLOGY | Facility: CLINIC | Age: 29
End: 2019-07-03
Attending: OBSTETRICS & GYNECOLOGY
Payer: COMMERCIAL

## 2019-07-03 VITALS
SYSTOLIC BLOOD PRESSURE: 118 MMHG | DIASTOLIC BLOOD PRESSURE: 62 MMHG | WEIGHT: 108.94 LBS | BODY MASS INDEX: 19.29 KG/M2

## 2019-07-03 DIAGNOSIS — N94.12 DEEP DYSPAREUNIA: ICD-10-CM

## 2019-07-03 DIAGNOSIS — R10.2 PELVIC PAIN: Primary | ICD-10-CM

## 2019-07-03 PROCEDURE — 87086 URINE CULTURE/COLONY COUNT: CPT

## 2019-07-03 PROCEDURE — 87491 CHLMYD TRACH DNA AMP PROBE: CPT

## 2019-07-03 PROCEDURE — 87510 GARDNER VAG DNA DIR PROBE: CPT

## 2019-07-03 PROCEDURE — 99999 PR PBB SHADOW E&M-EST. PATIENT-LVL II: CPT | Mod: PBBFAC,,, | Performed by: OBSTETRICS & GYNECOLOGY

## 2019-07-03 PROCEDURE — 99213 PR OFFICE/OUTPT VISIT, EST, LEVL III, 20-29 MIN: ICD-10-PCS | Mod: S$GLB,,, | Performed by: OBSTETRICS & GYNECOLOGY

## 2019-07-03 PROCEDURE — 99999 PR PBB SHADOW E&M-EST. PATIENT-LVL II: ICD-10-PCS | Mod: PBBFAC,,, | Performed by: OBSTETRICS & GYNECOLOGY

## 2019-07-03 PROCEDURE — 87480 CANDIDA DNA DIR PROBE: CPT

## 2019-07-03 PROCEDURE — 87077 CULTURE AEROBIC IDENTIFY: CPT

## 2019-07-03 PROCEDURE — 87088 URINE BACTERIA CULTURE: CPT

## 2019-07-03 PROCEDURE — 87186 SC STD MICRODIL/AGAR DIL: CPT

## 2019-07-03 PROCEDURE — 99213 OFFICE O/P EST LOW 20 MIN: CPT | Mod: S$GLB,,, | Performed by: OBSTETRICS & GYNECOLOGY

## 2019-07-03 NOTE — PROGRESS NOTES
HISTORY OF PRESENT ILLNESS:    Ramona Dukes is a 28 y.o. female, , No LMP recorded.,  presents for a problem visit, complaining of pelvic pain that started 2 weeks ago.  Woke up with bilateral lower pelvic pain twice.  Since then has had random cramping.  No dysuria, no discharge.  No new back pain, no fever.    Has 1 year history of pain with intercourse - mostly with deep penetration or certain positions.    2 mos ago treated for UTI in [place] - was having vaginal discharge at the time.     Past Medical History:   Diagnosis Date    Abnormal Pap smear of cervix        Past Surgical History:   Procedure Laterality Date    APPENDECTOMY         MEDICATIONS AND ALLERGIES:      Current Outpatient Medications:     escitalopram oxalate (LEXAPRO) 10 MG tablet, Take 1 tablet (10 mg total) by mouth once daily., Disp: 30 tablet, Rfl: 1    traZODone (DESYREL) 50 MG tablet, Take 1 tablet (50 mg total) by mouth every evening., Disp: 30 tablet, Rfl: 1    Review of patient's allergies indicates:  No Known Allergies    Family History   Problem Relation Age of Onset    Breast cancer Neg Hx     Colon cancer Neg Hx     Ovarian cancer Neg Hx     Cancer Neg Hx        Social History     Socioeconomic History    Marital status: Single     Spouse name: Not on file    Number of children: Not on file    Years of education: Not on file    Highest education level: Not on file   Occupational History    Not on file   Social Needs    Financial resource strain: Not on file    Food insecurity:     Worry: Not on file     Inability: Not on file    Transportation needs:     Medical: Not on file     Non-medical: Not on file   Tobacco Use    Smoking status: Never Smoker    Smokeless tobacco: Never Used   Substance and Sexual Activity    Alcohol use: Yes    Drug use: No    Sexual activity: Yes     Partners: Male     Birth control/protection: None   Lifestyle    Physical activity:     Days per week: Not on file     Minutes per  session: Not on file    Stress: Not on file   Relationships    Social connections:     Talks on phone: Not on file     Gets together: Not on file     Attends [employer] service: Not on file     Active member of club or organization: Not on file     Attends meetings of clubs or organizations: Not on file     Relationship status: Not on file   Other Topics Concern    Not on file   Social History Narrative    Not on file       ROS:  GENERAL: No weight changes. No swelling. No fatigue. No fever.  CARDIOVASCULAR: No chest pain. No shortness of breath. No leg cramps.   NEUROLOGICAL: No headaches. No vision changes.  BREASTS: No pain. No lumps. No discharge.  ABDOMEN: No pain. No nausea. No vomiting. No diarrhea. No constipation.  REPRODUCTIVE: No abnormal bleeding.   VULVA: No pain. No lesions. No itching.  VAGINA: No relaxation. No itching. No odor. No discharge. No lesions.  URINARY: No incontinence. No nocturia. No frequency. No dysuria.    /62     PE:  APPEARANCE: Well nourished, well developed, in no acute distress.  ABDOMEN: Soft. No tenderness or masses. No hepatosplenomegaly. No hernias.  BREASTS, FUNDOSCOPIC, RECTAL DEFERRED  PELVIC: External female genitalia without lesions.  Female hair distribution. Adequate perineal body, Normal urethral meatus. Vagina moist and well rugated without lesions or discharge.  No significant cystocele or rectocele present. Cervix pink without lesions, discharge, tender when reaching cervix. Uterus is normal size, regular, mobile and nontender. Adnexa without masses or tenderness.  EXTREMITIES: No edema      DIAGNOSIS & PLAN  1. Pelvic pain  Urine culture    C. trachomatis/N. gonorrhoeae by AMP DNA    Vaginosis Screen by DNA Probe   2. Deep dyspareunia         COUNSELING:  Discussed normal exam with patient.  Partner likely reaching cervix.

## 2019-07-04 LAB
C TRACH DNA SPEC QL NAA+PROBE: NOT DETECTED
N GONORRHOEA DNA SPEC QL NAA+PROBE: NOT DETECTED

## 2019-07-05 LAB
BACTERIAL VAGINOSIS DNA: NEGATIVE
CANDIDA GLABRATA DNA: NEGATIVE
CANDIDA KRUSEI DNA: NEGATIVE
CANDIDA RRNA VAG QL PROBE: NEGATIVE
T VAGINALIS RRNA GENITAL QL PROBE: NEGATIVE

## 2019-07-06 LAB — BACTERIA UR CULT: ABNORMAL

## 2019-07-08 ENCOUNTER — PATIENT MESSAGE (OUTPATIENT)
Dept: OBSTETRICS AND GYNECOLOGY | Facility: CLINIC | Age: 29
End: 2019-07-08

## 2019-07-08 RX ORDER — SULFAMETHOXAZOLE AND TRIMETHOPRIM 400; 80 MG/1; MG/1
1 TABLET ORAL 2 TIMES DAILY
Qty: 20 TABLET | Refills: 0 | Status: SHIPPED | OUTPATIENT
Start: 2019-07-08 | End: 2019-07-18

## 2019-07-17 ENCOUNTER — TELEPHONE (OUTPATIENT)
Dept: FAMILY MEDICINE | Facility: CLINIC | Age: 29
End: 2019-07-17

## 2019-07-17 NOTE — TELEPHONE ENCOUNTER
----- Message from Petra Mckeon sent at 7/17/2019 12:28 PM CDT -----  Contact: Pt  Pt would like orders to have her 2nd MMR and Varicella vaccine.

## 2019-07-18 ENCOUNTER — PATIENT MESSAGE (OUTPATIENT)
Dept: FAMILY MEDICINE | Facility: CLINIC | Age: 29
End: 2019-07-18

## 2019-07-22 ENCOUNTER — CLINICAL SUPPORT (OUTPATIENT)
Dept: INFECTIOUS DISEASES | Facility: CLINIC | Age: 29
End: 2019-07-22
Payer: COMMERCIAL

## 2019-07-22 PROCEDURE — 90716 VARICELLA VACCINE SQ: ICD-10-PCS | Mod: S$GLB,,, | Performed by: INTERNAL MEDICINE

## 2019-07-22 PROCEDURE — 90707 MMR VACCINE SQ: ICD-10-PCS | Mod: S$GLB,,, | Performed by: INTERNAL MEDICINE

## 2019-07-22 PROCEDURE — 90471 VARICELLA VACCINE SQ: ICD-10-PCS | Mod: S$GLB,,, | Performed by: INTERNAL MEDICINE

## 2019-07-22 PROCEDURE — 90472 IMMUNIZATION ADMIN EACH ADD: CPT | Mod: S$GLB,,, | Performed by: INTERNAL MEDICINE

## 2019-07-22 PROCEDURE — 90707 MMR VACCINE SC: CPT | Mod: S$GLB,,, | Performed by: INTERNAL MEDICINE

## 2019-07-22 PROCEDURE — 90716 VAR VACCINE LIVE SUBQ: CPT | Mod: S$GLB,,, | Performed by: INTERNAL MEDICINE

## 2019-07-22 PROCEDURE — 90471 IMMUNIZATION ADMIN: CPT | Mod: S$GLB,,, | Performed by: INTERNAL MEDICINE

## 2019-07-22 PROCEDURE — 90472 MMR VACCINE SQ: ICD-10-PCS | Mod: S$GLB,,, | Performed by: INTERNAL MEDICINE

## 2019-08-01 ENCOUNTER — TELEPHONE (OUTPATIENT)
Dept: FAMILY MEDICINE | Facility: CLINIC | Age: 29
End: 2019-08-01

## 2019-08-01 DIAGNOSIS — R76.12 POSITIVE QUANTIFERON-TB GOLD TEST: Primary | ICD-10-CM

## 2019-08-01 NOTE — TELEPHONE ENCOUNTER
----- Message from Natalie Dial sent at 8/1/2019 11:47 AM CDT -----  Contact: Pt  Patient called stating that she was advice by [facility] to see a  pulmonary doctor due to a positive TB Gold test. Patient was requesting a referral. Patient will discuss in further detail when call is returned. Patient stated that she will be leaving out of town on 8/14/19. Please contact patient. Thank you    Patient can be reached at [phone]

## 2019-08-07 ENCOUNTER — OFFICE VISIT (OUTPATIENT)
Dept: PULMONOLOGY | Facility: CLINIC | Age: 29
End: 2019-08-07
Payer: COMMERCIAL

## 2019-08-07 VITALS
HEART RATE: 87 BPM | WEIGHT: 109.44 LBS | SYSTOLIC BLOOD PRESSURE: 109 MMHG | BODY MASS INDEX: 19.39 KG/M2 | HEIGHT: 63 IN | TEMPERATURE: 98 F | RESPIRATION RATE: 17 BRPM | OXYGEN SATURATION: 100 % | DIASTOLIC BLOOD PRESSURE: 74 MMHG

## 2019-08-07 DIAGNOSIS — Z22.7 LTBI (LATENT TUBERCULOSIS INFECTION): ICD-10-CM

## 2019-08-07 PROCEDURE — 99999 PR PBB SHADOW E&M-EST. PATIENT-LVL III: CPT | Mod: PBBFAC,,, | Performed by: INTERNAL MEDICINE

## 2019-08-07 PROCEDURE — 99243 OFF/OP CNSLTJ NEW/EST LOW 30: CPT | Mod: S$GLB,,, | Performed by: INTERNAL MEDICINE

## 2019-08-07 PROCEDURE — 99243 PR OFFICE CONSULTATION,LEVEL III: ICD-10-PCS | Mod: S$GLB,,, | Performed by: INTERNAL MEDICINE

## 2019-08-07 PROCEDURE — 99999 PR PBB SHADOW E&M-EST. PATIENT-LVL III: ICD-10-PCS | Mod: PBBFAC,,, | Performed by: INTERNAL MEDICINE

## 2019-08-07 NOTE — LETTER
August 7, 2019      Kathleen Matthews MD  [address]  [address]  [facility] [address]           [facility]  [address] [facility] [address]  [place] [address]  Phone: [phone]  Fax: [phone]          Patient: Ramona Dukes   MR Number: [id number]   YOB: 1990   Date of Visit: 8/7/2019       Dear Dr. Kathleen Matthews:    Thank you for referring Ramona Dukes to me for evaluation. Attached you will find relevant portions of my assessment and plan of care.    If you have questions, please do not hesitate to call me. I look forward to following Ramona Dukes along with you.    Sincerely,    Derian Canas MD    Enclosure  CC:  No Recipients    If you would like to receive this communication electronically, please contact [email or web address] or [phone] to request more information on [employer] access.    For providers and/or their staff who would like to refer a patient to [facility], please contact us through our one-stop-shop provider referral line, [facility], at [phone].    If you feel you have received this communication in error or would no longer like to receive these types of communications, please e-mail [email or web address]

## 2019-08-07 NOTE — ASSESSMENT & PLAN NOTE
S/p inh therapy in 2011 in [facility].  cxr and clinical findings without evidence of active tuberculosis.  No restriction from pulmonary perspective.

## 2019-08-07 NOTE — PROGRESS NOTES
Ramona Dukes  was seen as a new patient at the request  Kathleen Matthews MD for the evaluation of  ltbi.    CHIEF COMPLAINT:  Annual Exam (Immigration physical)      HISTORY OF PRESENT ILLNESS: Ramona Dukes is a 29 y.o. female  has a past medical history of Abnormal Pap smear of cervix.  Patient migrate from [facility] to  in 2011.  Patient enrolled in [place], [place].  At that time, patient was diagnosed with LTBI via PPD.  S/p therapy (cannot recall med) x 6 months via student health.  No issue since that time.  [facility] who recommended pulmonary clearance.    Patient denied coughing, fever/chill.  No hemoptysis.  No weight loss.  Patient denied gonzalez with adl.      PAST MEDICAL HISTORY:    Active Ambulatory Problems     Diagnosis Date Noted    Episodic lightheadedness 11/21/2017    Near syncope 11/21/2017    Episodic headache 11/21/2017    Family history of PDA (patent ductus arteriosus) 11/21/2017    Dizziness 12/12/2017    Migraine with aura and without status migrainosus, not intractable 04/18/2018    Other mixed anxiety disorders 04/18/2018    LTBI (latent tuberculosis infection) 08/07/2019     Resolved Ambulatory Problems     Diagnosis Date Noted    No Resolved Ambulatory Problems     Past Medical History:   Diagnosis Date    Abnormal Pap smear of cervix                 PAST SURGICAL HISTORY:    Past Surgical History:   Procedure Laterality Date    APPENDECTOMY           FAMILY HISTORY:                Family History   Problem Relation Age of Onset    Breast cancer Neg Hx     Colon cancer Neg Hx     Ovarian cancer Neg Hx     Cancer Neg Hx        SOCIAL HISTORY:          Tobacco:   Social History     Tobacco Use   Smoking Status Never Smoker   Smokeless Tobacco Never Used     alcohol use:    Social History     Substance and Sexual Activity   Alcohol Use Yes    Comment: once per week               Occupation:  College student    ALLERGIES:   "Review of patient's allergies indicates:  No Known Allergies    CURRENT MEDICATIONS:    No current outpatient medications on file.     No current facility-administered medications for this visit.                   REVIEW OF SYSTEMS:     Pulmonary related symptoms as per HPI.  Gen:  no weight loss, no fever, no night sweat  HEENT:  no visual changes, no sore throat, no hearing loss  CV:  No chest pain, no orthopnea, no PND  GI:  no melena, no hematochezia, no diarhea, no constipation.  :  no dysuria, no hematuria, no hesistancy, no dribbling  Neuro:  no syncope, no vertigo, no tinitus  Psych:  No homocide or suicide ideation; no depression.  Endocrine:  No heat or cold intolerance.  Sleep:  No snoring; no witnessed apnea.  Otherwise, a balance of systems reviewed is negative.          PHYSICAL EXAM:  Vitals:    08/07/19 0827   BP: 109/74   Pulse: 87   Resp: 17   Temp: 98 °F (36.7 °C)   TempSrc: Oral   SpO2: 100%   Weight: 49.6 kg (109 lb 7.3 oz)   Height: 5' 3" (1.6 m)   PainSc: 0-No pain     Body mass index is 19.39 kg/m².     GENERAL:  well develop; no apparent distress  HEENT:  no nasal congestion; no discharge noted; class 3 modified mallampatti.   NECK:  supple; no palpable masses.  CARDIO: regular rate and rhythm  PULM:  clear to auscultation bilaterally; no intercostals retractions; no accessory muscle usage   ABDOMEN:  soft nontender/nondistended.  +bowel sound  EXTREMITIES no cce  NEURO:  CN II-XII intact.  5/5 motor in all extremities.  sensation grossly intact   to light touch.  PSYCH:  normal affect.  Alert and oriented x 4    LABS  Pulmonary Functions Testing Results(personally reviewed):  none  ABG (personally reviewed):  none  CXR (personally reviewed):  1/29/18 no effusion or consolidation  7/22/18 (from outside facility) no consolidation or effusiont  CT CHEST(personally reviewed):  none  quantiferon 1/25/18 postive.  ASSESSMENT/PLAN  Problem List Items Addressed This Visit     LTBI (latent " tuberculosis infection)    Current Assessment & Plan     S/p inh therapy in 2011 in [facility].  cxr and clinical findings without evidence of active tuberculosis.  No restriction from pulmonary perspective.                   Patient will Follow up if symptoms worsen or fail to improve. with md/np.    CC: Send copy of this note to Kathleen Matthews MD

## 2020-10-05 ENCOUNTER — PATIENT MESSAGE (OUTPATIENT)
Dept: INTERNAL MEDICINE | Facility: CLINIC | Age: 30
End: 2020-10-05

## 2021-03-27 ENCOUNTER — IMMUNIZATION (OUTPATIENT)
Dept: PRIMARY CARE CLINIC | Facility: CLINIC | Age: 31
End: 2021-03-27
Payer: COMMERCIAL

## 2021-03-27 DIAGNOSIS — Z23 NEED FOR VACCINATION: Primary | ICD-10-CM

## 2021-03-27 PROCEDURE — 0001A PR IMMUNIZ ADMIN, SARS-COV-2 COVID-19 VACC, 30MCG/0.3ML, 1ST DOSE: CPT | Mod: CV19,S$GLB,, | Performed by: INTERNAL MEDICINE

## 2021-03-27 PROCEDURE — 0001A PR IMMUNIZ ADMIN, SARS-COV-2 COVID-19 VACC, 30MCG/0.3ML, 1ST DOSE: ICD-10-PCS | Mod: CV19,S$GLB,, | Performed by: INTERNAL MEDICINE

## 2021-03-27 PROCEDURE — 91300 PR SARS-COV- 2 COVID-19 VACCINE, NO PRSV, 30MCG/0.3ML, IM: ICD-10-PCS | Mod: S$GLB,,, | Performed by: INTERNAL MEDICINE

## 2021-03-27 PROCEDURE — 91300 PR SARS-COV- 2 COVID-19 VACCINE, NO PRSV, 30MCG/0.3ML, IM: CPT | Mod: S$GLB,,, | Performed by: INTERNAL MEDICINE

## 2021-03-27 RX ADMIN — Medication 0.3 ML: at 05:03

## 2021-03-29 ENCOUNTER — HOSPITAL ENCOUNTER (EMERGENCY)
Facility: OTHER | Age: 31
Discharge: HOME | End: 2021-03-29
Attending: EMERGENCY MEDICINE
Payer: COMMERCIAL

## 2021-03-29 VITALS
RESPIRATION RATE: 16 BRPM | OXYGEN SATURATION: 98 % | DIASTOLIC BLOOD PRESSURE: 67 MMHG | BODY MASS INDEX: 18.61 KG/M2 | TEMPERATURE: 99 F | HEART RATE: 69 BPM | WEIGHT: 105 LBS | SYSTOLIC BLOOD PRESSURE: 97 MMHG | HEIGHT: 63 IN

## 2021-03-29 DIAGNOSIS — F32.A DEPRESSION, UNSPECIFIED DEPRESSION TYPE: ICD-10-CM

## 2021-03-29 DIAGNOSIS — F41.0 ANXIETY ATTACK: Primary | ICD-10-CM

## 2021-03-29 LAB
ALBUMIN SERPL BCP-MCNC: 4.4 G/DL (ref 3.5–5.2)
ALP SERPL-CCNC: 52 U/L (ref 55–135)
ALT SERPL W/O P-5'-P-CCNC: 14 U/L (ref 10–44)
ANION GAP SERPL CALC-SCNC: 6 MMOL/L (ref 8–16)
APAP SERPL-MCNC: 5 UG/ML (ref 10–20)
AST SERPL-CCNC: 18 U/L (ref 10–40)
B-HCG UR QL: NEGATIVE
BASOPHILS # BLD AUTO: 0.02 K/UL (ref 0–0.2)
BASOPHILS NFR BLD: 0.2 % (ref 0–1.9)
BILIRUB SERPL-MCNC: 0.4 MG/DL (ref 0.1–1)
BUN SERPL-MCNC: 13 MG/DL (ref 6–20)
CALCIUM SERPL-MCNC: 9.4 MG/DL (ref 8.7–10.5)
CHLORIDE SERPL-SCNC: 109 MMOL/L (ref 95–110)
CO2 SERPL-SCNC: 23 MMOL/L (ref 23–29)
CREAT SERPL-MCNC: 0.7 MG/DL (ref 0.5–1.4)
CTP QC/QA: YES
CTP QC/QA: YES
DIFFERENTIAL METHOD: ABNORMAL
EOSINOPHIL # BLD AUTO: 0.1 K/UL (ref 0–0.5)
EOSINOPHIL NFR BLD: 0.5 % (ref 0–8)
ERYTHROCYTE [DISTWIDTH] IN BLOOD BY AUTOMATED COUNT: 11.6 % (ref 11.5–14.5)
EST. GFR  (AFRICAN AMERICAN): >60 ML/MIN/1.73 M^2
EST. GFR  (NON AFRICAN AMERICAN): >60 ML/MIN/1.73 M^2
ETHANOL SERPL-MCNC: <10 MG/DL
GLUCOSE SERPL-MCNC: 128 MG/DL (ref 70–110)
HCT VFR BLD AUTO: 40 % (ref 37–48.5)
HGB BLD-MCNC: 13.7 G/DL (ref 12–16)
IMM GRANULOCYTES # BLD AUTO: 0.03 K/UL (ref 0–0.04)
IMM GRANULOCYTES NFR BLD AUTO: 0.3 % (ref 0–0.5)
LYMPHOCYTES # BLD AUTO: 1.8 K/UL (ref 1–4.8)
LYMPHOCYTES NFR BLD: 19.7 % (ref 18–48)
MCH RBC QN AUTO: 31.7 PG (ref 27–31)
MCHC RBC AUTO-ENTMCNC: 34.3 G/DL (ref 32–36)
MCV RBC AUTO: 93 FL (ref 82–98)
MONOCYTES # BLD AUTO: 0.5 K/UL (ref 0.3–1)
MONOCYTES NFR BLD: 5.6 % (ref 4–15)
NEUTROPHILS # BLD AUTO: 6.9 K/UL (ref 1.8–7.7)
NEUTROPHILS NFR BLD: 73.7 % (ref 38–73)
NRBC BLD-RTO: 0 /100 WBC
PLATELET # BLD AUTO: 257 K/UL (ref 150–450)
PMV BLD AUTO: 8.6 FL (ref 9.2–12.9)
POTASSIUM SERPL-SCNC: 4.2 MMOL/L (ref 3.5–5.1)
PROT SERPL-MCNC: 7.1 G/DL (ref 6–8.4)
RBC # BLD AUTO: 4.32 M/UL (ref 4–5.4)
SARS-COV-2 RDRP RESP QL NAA+PROBE: NEGATIVE
SODIUM SERPL-SCNC: 138 MMOL/L (ref 136–145)
TSH SERPL DL<=0.005 MIU/L-ACNC: 1.09 UIU/ML (ref 0.4–4)
WBC # BLD AUTO: 9.36 K/UL (ref 3.9–12.7)

## 2021-03-29 PROCEDURE — G0426 INPT/ED TELECONSULT50: HCPCS | Mod: 95,,, | Performed by: PSYCHIATRY & NEUROLOGY

## 2021-03-29 PROCEDURE — 81025 URINE PREGNANCY TEST: CPT | Performed by: EMERGENCY MEDICINE

## 2021-03-29 PROCEDURE — 80053 COMPREHEN METABOLIC PANEL: CPT | Performed by: PHYSICIAN ASSISTANT

## 2021-03-29 PROCEDURE — 82077 ASSAY SPEC XCP UR&BREATH IA: CPT | Performed by: PHYSICIAN ASSISTANT

## 2021-03-29 PROCEDURE — G0426 PR INPT TELEHEALTH CONSULT 50M: ICD-10-PCS | Mod: 95,,, | Performed by: PSYCHIATRY & NEUROLOGY

## 2021-03-29 PROCEDURE — 25000003 PHARM REV CODE 250: Performed by: EMERGENCY MEDICINE

## 2021-03-29 PROCEDURE — 80143 DRUG ASSAY ACETAMINOPHEN: CPT | Performed by: PHYSICIAN ASSISTANT

## 2021-03-29 PROCEDURE — 84443 ASSAY THYROID STIM HORMONE: CPT | Performed by: PHYSICIAN ASSISTANT

## 2021-03-29 PROCEDURE — U0002 COVID-19 LAB TEST NON-CDC: HCPCS | Performed by: EMERGENCY MEDICINE

## 2021-03-29 PROCEDURE — 85025 COMPLETE CBC W/AUTO DIFF WBC: CPT | Performed by: PHYSICIAN ASSISTANT

## 2021-03-29 PROCEDURE — 99284 EMERGENCY DEPT VISIT MOD MDM: CPT | Mod: 25

## 2021-03-29 RX ORDER — SERTRALINE HYDROCHLORIDE 25 MG/1
25 TABLET, FILM COATED ORAL DAILY
Qty: 30 TABLET | Refills: 1 | Status: SHIPPED | OUTPATIENT
Start: 2021-03-29 | End: 2021-05-28

## 2021-03-29 RX ORDER — HYDROXYZINE PAMOATE 25 MG/1
25 CAPSULE ORAL EVERY 8 HOURS PRN
Qty: 30 CAPSULE | Refills: 1 | Status: SHIPPED | OUTPATIENT
Start: 2021-03-29 | End: 2022-05-25

## 2021-03-29 RX ORDER — SERTRALINE HYDROCHLORIDE 25 MG/1
25 TABLET, FILM COATED ORAL
Status: COMPLETED | OUTPATIENT
Start: 2021-03-29 | End: 2021-03-29

## 2021-03-29 RX ORDER — SERTRALINE HYDROCHLORIDE 25 MG/1
25 TABLET, FILM COATED ORAL DAILY
Status: DISCONTINUED | OUTPATIENT
Start: 2021-03-30 | End: 2021-03-29

## 2021-03-29 RX ADMIN — SERTRALINE HYDROCHLORIDE 25 MG: 25 TABLET ORAL at 07:03

## 2021-04-01 ENCOUNTER — NURSE TRIAGE (OUTPATIENT)
Dept: ADMINISTRATIVE | Facility: CLINIC | Age: 31
End: 2021-04-01

## 2021-04-23 ENCOUNTER — IMMUNIZATION (OUTPATIENT)
Dept: PRIMARY CARE CLINIC | Facility: CLINIC | Age: 31
End: 2021-04-23
Payer: COMMERCIAL

## 2021-04-23 DIAGNOSIS — Z23 NEED FOR VACCINATION: Primary | ICD-10-CM

## 2021-04-23 PROCEDURE — 91300 PR SARS-COV- 2 COVID-19 VACCINE, NO PRSV, 30MCG/0.3ML, IM: ICD-10-PCS | Mod: S$GLB,,, | Performed by: INTERNAL MEDICINE

## 2021-04-23 PROCEDURE — 0002A PR IMMUNIZ ADMIN, SARS-COV-2 COVID-19 VACC, 30MCG/0.3ML, 2ND DOSE: ICD-10-PCS | Mod: CV19,S$GLB,, | Performed by: INTERNAL MEDICINE

## 2021-04-23 PROCEDURE — 91300 PR SARS-COV- 2 COVID-19 VACCINE, NO PRSV, 30MCG/0.3ML, IM: CPT | Mod: S$GLB,,, | Performed by: INTERNAL MEDICINE

## 2021-04-23 PROCEDURE — 0002A PR IMMUNIZ ADMIN, SARS-COV-2 COVID-19 VACC, 30MCG/0.3ML, 2ND DOSE: CPT | Mod: CV19,S$GLB,, | Performed by: INTERNAL MEDICINE

## 2021-04-23 RX ADMIN — Medication 0.3 ML: at 01:04

## 2021-05-28 ENCOUNTER — TELEPHONE (OUTPATIENT)
Dept: INTERNAL MEDICINE | Facility: CLINIC | Age: 31
End: 2021-05-28

## 2021-08-03 ENCOUNTER — TELEPHONE (OUTPATIENT)
Dept: INTERNAL MEDICINE | Facility: CLINIC | Age: 31
End: 2021-08-03

## 2022-05-25 ENCOUNTER — OFFICE VISIT (OUTPATIENT)
Dept: OBSTETRICS AND GYNECOLOGY | Facility: CLINIC | Age: 32
End: 2022-05-25
Attending: OBSTETRICS & GYNECOLOGY
Payer: COMMERCIAL

## 2022-05-25 VITALS
BODY MASS INDEX: 18.32 KG/M2 | WEIGHT: 103.38 LBS | HEIGHT: 63 IN | SYSTOLIC BLOOD PRESSURE: 116 MMHG | DIASTOLIC BLOOD PRESSURE: 78 MMHG

## 2022-05-25 DIAGNOSIS — B97.7 HIGH RISK HPV INFECTION: ICD-10-CM

## 2022-05-25 DIAGNOSIS — Z71.85 HPV VACCINE COUNSELING: ICD-10-CM

## 2022-05-25 DIAGNOSIS — N94.6 DYSMENORRHEA: ICD-10-CM

## 2022-05-25 DIAGNOSIS — Z01.419 WELL WOMAN EXAM: Primary | ICD-10-CM

## 2022-05-25 PROCEDURE — 1159F PR MEDICATION LIST DOCUMENTED IN MEDICAL RECORD: ICD-10-PCS | Mod: CPTII,S$GLB,, | Performed by: OBSTETRICS & GYNECOLOGY

## 2022-05-25 PROCEDURE — 3008F BODY MASS INDEX DOCD: CPT | Mod: CPTII,S$GLB,, | Performed by: OBSTETRICS & GYNECOLOGY

## 2022-05-25 PROCEDURE — 3074F PR MOST RECENT SYSTOLIC BLOOD PRESSURE < 130 MM HG: ICD-10-PCS | Mod: CPTII,S$GLB,, | Performed by: OBSTETRICS & GYNECOLOGY

## 2022-05-25 PROCEDURE — 99395 PR PREVENTIVE VISIT,EST,18-39: ICD-10-PCS | Mod: S$GLB,,, | Performed by: OBSTETRICS & GYNECOLOGY

## 2022-05-25 PROCEDURE — 88141 PR  CYTOPATH CERV/VAG INTERPRET: ICD-10-PCS | Mod: ,,, | Performed by: PATHOLOGY

## 2022-05-25 PROCEDURE — 99395 PREV VISIT EST AGE 18-39: CPT | Mod: S$GLB,,, | Performed by: OBSTETRICS & GYNECOLOGY

## 2022-05-25 PROCEDURE — 3078F DIAST BP <80 MM HG: CPT | Mod: CPTII,S$GLB,, | Performed by: OBSTETRICS & GYNECOLOGY

## 2022-05-25 PROCEDURE — 99999 PR PBB SHADOW E&M-EST. PATIENT-LVL III: CPT | Mod: PBBFAC,,, | Performed by: OBSTETRICS & GYNECOLOGY

## 2022-05-25 PROCEDURE — 1159F MED LIST DOCD IN RCRD: CPT | Mod: CPTII,S$GLB,, | Performed by: OBSTETRICS & GYNECOLOGY

## 2022-05-25 PROCEDURE — 87624 HPV HI-RISK TYP POOLED RSLT: CPT | Performed by: OBSTETRICS & GYNECOLOGY

## 2022-05-25 PROCEDURE — 3078F PR MOST RECENT DIASTOLIC BLOOD PRESSURE < 80 MM HG: ICD-10-PCS | Mod: CPTII,S$GLB,, | Performed by: OBSTETRICS & GYNECOLOGY

## 2022-05-25 PROCEDURE — 88175 CYTOPATH C/V AUTO FLUID REDO: CPT | Performed by: PATHOLOGY

## 2022-05-25 PROCEDURE — 99999 PR PBB SHADOW E&M-EST. PATIENT-LVL III: ICD-10-PCS | Mod: PBBFAC,,, | Performed by: OBSTETRICS & GYNECOLOGY

## 2022-05-25 PROCEDURE — 88141 CYTOPATH C/V INTERPRET: CPT | Mod: ,,, | Performed by: PATHOLOGY

## 2022-05-25 PROCEDURE — 1160F PR REVIEW ALL MEDS BY PRESCRIBER/CLIN PHARMACIST DOCUMENTED: ICD-10-PCS | Mod: CPTII,S$GLB,, | Performed by: OBSTETRICS & GYNECOLOGY

## 2022-05-25 PROCEDURE — 1160F RVW MEDS BY RX/DR IN RCRD: CPT | Mod: CPTII,S$GLB,, | Performed by: OBSTETRICS & GYNECOLOGY

## 2022-05-25 PROCEDURE — 3008F PR BODY MASS INDEX (BMI) DOCUMENTED: ICD-10-PCS | Mod: CPTII,S$GLB,, | Performed by: OBSTETRICS & GYNECOLOGY

## 2022-05-25 PROCEDURE — 3074F SYST BP LT 130 MM HG: CPT | Mod: CPTII,S$GLB,, | Performed by: OBSTETRICS & GYNECOLOGY

## 2022-05-25 RX ORDER — IBUPROFEN 600 MG/1
600 TABLET ORAL EVERY 6 HOURS PRN
Qty: 60 TABLET | Refills: 2 | Status: SHIPPED | OUTPATIENT
Start: 2022-05-25 | End: 2023-05-25

## 2022-05-25 NOTE — PROGRESS NOTES
"CC: Well woman exam    Ramona Dukes is a 31 y.o. female  presents for well woman exam.  LMP: Patient's last menstrual period was 2022..  No gyn issues, problems, or complaints.       ASCUS/HPV+   Colpo CAMI 1, ECC negative    -  in [place].  Had normal pap x 2  Going to move back to [place] sometime in the next few months    She has had gardasil x 2 - needs last dose    Cycles regular.  Intense cramping first 2 days.  Tried ocps for a week previously but didn't like the hormones    Past Medical History:   Diagnosis Date    Abnormal Pap smear of cervix      Past Surgical History:   Procedure Laterality Date    APPENDECTOMY       Social History     Socioeconomic History    Marital status: Single   Tobacco Use    Smoking status: Never Smoker    Smokeless tobacco: Never Used   Substance and Sexual Activity    Alcohol use: Yes     Comment: once per week    Drug use: No    Sexual activity: Yes     Partners: Male     Birth control/protection: None     Family History   Problem Relation Age of Onset    Breast cancer Neg Hx     Colon cancer Neg Hx     Ovarian cancer Neg Hx     Cancer Neg Hx      OB History        0    Para   0    Term   0       0    AB   0    Living   0       SAB   0    IAB   0    Ectopic   0    Multiple   0    Live Births   0                 /78   Ht 5' 3" (1.6 m)   Wt 46.9 kg (103 lb 6.3 oz)   LMP 2022   BMI 18.32 kg/m²       ROS:  GENERAL: Denies weight gain or weight loss. Feeling well overall.   SKIN: Denies rash or lesions.   HEAD: Denies head injury or headache.   NODES: Denies enlarged lymph nodes.   CHEST: Denies chest pain or shortness of breath.   CARDIOVASCULAR: Denies palpitations or left sided chest pain.   ABDOMEN: No abdominal pain, constipation, diarrhea, nausea, vomiting or rectal bleeding.   URINARY: No frequency, dysuria, hematuria, or burning on urination.  REPRODUCTIVE: See HPI.   BREASTS: The patient performs breast " self-examination and denies pain, lumps, or nipple discharge.   HEMATOLOGIC: No easy bruisability or excessive bleeding.   MUSCULOSKELETAL: Denies joint pain or swelling.   NEUROLOGIC: Denies syncope or weakness.   PSYCHIATRIC: Denies depression, anxiety or mood swings.    PHYSICAL EXAM:  APPEARANCE: Well nourished, well developed, in no acute distress.  AFFECT: WNL, alert and oriented x 3  SKIN: No acne or hirsutism  NECK: Neck symmetric without masses or thyromegaly  NODES: No inguinal, cervical, axillary, or femoral lymph node enlargement  CHEST: Good respiratory effect  ABDOMEN: Soft.  No tenderness or masses.  No hepatosplenomegaly.  No hernias.  BREASTS: Symmetrical, no skin changes or visible lesions.  No palpable masses, nipple discharge bilaterally.  PELVIC: Normal external genitalia without lesions.  Normal hair distribution.  Adequate perineal body, normal urethral meatus.  Vagina moist and well rugated without lesions or discharge.  Cervix pink, without lesions, discharge or tenderness.  No significant cystocele or rectocele.  Bimanual exam shows uterus to be normal size, regular, mobile and nontender.  Adnexa without masses or tenderness.    EXTREMITIES: No edema.      ASSESSMENT & PLAN    ICD-10-CM ICD-9-CM    1. Well woman exam  Z01.419 V72.31 Liquid-Based Pap Smear, Screening      HPV High Risk Genotypes, PCR   2. High risk HPV infection  B97.7 079.4    3. HPV vaccine counseling  Z71.89 V65.49 (In Office Administered) HPV Vaccine (9-Valent) (3 Dose) (IM)   4. Dysmenorrhea  N94.6 625.3 ibuprofen (ADVIL,MOTRIN) 600 MG tablet        Discussed options for dysmenorrhea  Patient was counseled today on A.C.S. Pap guidelines and recommendations for yearly pelvic exams, mammograms and monthly self breast exams; to see her PCP for other health maintenance.

## 2022-05-27 LAB
HPV HR 12 DNA SPEC QL NAA+PROBE: POSITIVE
HPV16 AG SPEC QL: NEGATIVE
HPV18 DNA SPEC QL NAA+PROBE: NEGATIVE

## 2022-05-31 LAB
FINAL PATHOLOGIC DIAGNOSIS: ABNORMAL
Lab: ABNORMAL

## 2022-06-01 ENCOUNTER — PATIENT MESSAGE (OUTPATIENT)
Dept: OBSTETRICS AND GYNECOLOGY | Facility: CLINIC | Age: 32
End: 2022-06-01
Payer: COMMERCIAL

## 2022-06-20 ENCOUNTER — PROCEDURE VISIT (OUTPATIENT)
Dept: OBSTETRICS AND GYNECOLOGY | Facility: CLINIC | Age: 32
End: 2022-06-20
Attending: OBSTETRICS & GYNECOLOGY
Payer: COMMERCIAL

## 2022-06-20 VITALS
WEIGHT: 102.5 LBS | BODY MASS INDEX: 18.16 KG/M2 | DIASTOLIC BLOOD PRESSURE: 60 MMHG | SYSTOLIC BLOOD PRESSURE: 110 MMHG | HEIGHT: 63 IN

## 2022-06-20 DIAGNOSIS — B97.7 HIGH RISK HPV INFECTION: Primary | ICD-10-CM

## 2022-06-20 PROCEDURE — 88305 TISSUE EXAM BY PATHOLOGIST: ICD-10-PCS | Mod: 26,,, | Performed by: PATHOLOGY

## 2022-06-20 PROCEDURE — 57454 PR COLPOSC,CERVIX W/ADJ VAG,W/BX & CURRETAG: ICD-10-PCS | Mod: S$GLB,,, | Performed by: OBSTETRICS & GYNECOLOGY

## 2022-06-20 PROCEDURE — 88305 TISSUE EXAM BY PATHOLOGIST: CPT | Mod: 59 | Performed by: PATHOLOGY

## 2022-06-20 PROCEDURE — 57454 BX/CURETT OF CERVIX W/SCOPE: CPT | Mod: S$GLB,,, | Performed by: OBSTETRICS & GYNECOLOGY

## 2022-06-20 PROCEDURE — 88305 TISSUE EXAM BY PATHOLOGIST: CPT | Mod: 26,,, | Performed by: PATHOLOGY

## 2022-06-20 NOTE — PROCEDURES
Procedures   COLPOSCOPY:    Ramona Dukes is a 31 y.o. female   presents for colposcopy.  Patient's last menstrual period was 2022..  Her most recent pap smear shows ASCUS/ HPV+.      The abnormal test findings were discussed, as well as HPV infection, need for colposcopy and possible biopsies to determine the plan of care, treatments available, the minimal risk of bleeding and infection with colposcopy, and alternatives to colposcopy and she agrees to proceed.      UPT is negative    COLPOSCOPY EXAM:   TIME OUT PERFORMED.     acetowhite lesion(s) noted at 6 o'clock    Biopsy was taken at 6 o'clock.  ECC was performed    Hemostasis was adequate with application of Monsel's solution.  The speculum was removed.  The patient did tolerate the procedure well.    All collected specimens sent to pathology for histologic analysis.    Post-colposcopy counseling:  The patient was instructed to manage post-colposcopy cramping with NSAIDs or Tylenol, or with a prescription per the medication card.  Avoid intercourse, douching, or tampons in the vagina for at least 2-3 days.  Expect a clumpy blackish discharge due to Monsel's solution application for several days.  Report heavy bleeding, worsening pain or pain that does not respond to above medications, or foul-smelling vaginal discharge. HPV vaccine recommended according to FDA age guidelines.  Importance of follow-up stressed.      Follow up based on colposcopy results.

## 2022-06-20 NOTE — LETTER
June 20, 2022    Ramona Dukes  [address] [facility] [address]             [facility]etrics [facility] Gyne[facility]  [address] [address]  Phone: [phone]  Fax: [phone]   June 20, 2022     Patient: Ramona Dukes   YOB: 1990   Date of Visit: 6/20/2022       To Whom it May Concern:    Ramona Dukes was seen in my clinic on 6/20/2022. She may return to work on 6/22/23.    Please excuse her from any classes or work missed.    If you have any questions or concerns, please don't hesitate to call.    Sincerely,         Sabrina Chung MD

## 2022-06-27 LAB
FINAL PATHOLOGIC DIAGNOSIS: NORMAL
Lab: NORMAL